# Patient Record
Sex: MALE | Race: WHITE | NOT HISPANIC OR LATINO | Employment: FULL TIME | URBAN - METROPOLITAN AREA
[De-identification: names, ages, dates, MRNs, and addresses within clinical notes are randomized per-mention and may not be internally consistent; named-entity substitution may affect disease eponyms.]

---

## 2017-02-27 ENCOUNTER — OFFICE VISIT (OUTPATIENT)
Dept: SPORTS MEDICINE | Facility: CLINIC | Age: 57
End: 2017-02-27

## 2017-02-27 VITALS
WEIGHT: 188 LBS | HEIGHT: 67 IN | BODY MASS INDEX: 29.51 KG/M2 | OXYGEN SATURATION: 92 % | TEMPERATURE: 98.4 F | HEART RATE: 79 BPM | SYSTOLIC BLOOD PRESSURE: 110 MMHG | DIASTOLIC BLOOD PRESSURE: 74 MMHG | RESPIRATION RATE: 14 BRPM

## 2017-02-27 DIAGNOSIS — N40.0 BENIGN PROSTATIC HYPERPLASIA, PRESENCE OF LOWER URINARY TRACT SYMPTOMS UNSPECIFIED, UNSPECIFIED MORPHOLOGY: ICD-10-CM

## 2017-02-27 DIAGNOSIS — J01.00 ACUTE NON-RECURRENT MAXILLARY SINUSITIS: Primary | ICD-10-CM

## 2017-02-27 PROCEDURE — 99213 OFFICE O/P EST LOW 20 MIN: CPT | Performed by: FAMILY MEDICINE

## 2017-02-27 RX ORDER — IPRATROPIUM BROMIDE 42 UG/1
2 SPRAY, METERED NASAL 3 TIMES DAILY PRN
Qty: 15 ML | Refills: 0 | Status: SHIPPED | OUTPATIENT
Start: 2017-02-27 | End: 2020-08-17

## 2017-02-27 RX ORDER — AMOXICILLIN AND CLAVULANATE POTASSIUM 875; 125 MG/1; MG/1
1 TABLET, FILM COATED ORAL 2 TIMES DAILY
Qty: 20 TABLET | Refills: 0 | Status: SHIPPED | OUTPATIENT
Start: 2017-02-27 | End: 2017-03-09

## 2017-02-27 RX ORDER — TAMSULOSIN HYDROCHLORIDE 0.4 MG/1
1 CAPSULE ORAL NIGHTLY
COMMUNITY

## 2017-02-27 NOTE — PROGRESS NOTES
"Trent is a 56 y.o. year old male    Chief Complaint   Patient presents with   • Nasal Congestion     Since Saturday.    • URI   • Sore Throat   • Cough       History of Present Illness  URI: On and off for the past 3 weeks.  Acutely worsened over the weekend.  He has had sinus pressure, postnasal drip, productive cough with yellow tinged sputum.  Also associates some rhinorrhea that is occasionally tinged with blood.  Has been using Afrin nasal spray sparingly.  Also, has been using Tessalon Perles to help with the cough.  Denies fever or myalgias.    BPH: Recently diagnosed.  [facility]  Has had PSA drawn recently and is trending downward since starting Flomax.    I have reviewed the patient's medical history in detail and updated the computerized patient record.    Review of Systems   Constitutional: Negative for chills, fatigue and fever.   HENT: Positive for congestion, postnasal drip, rhinorrhea and sinus pressure.    Respiratory: Positive for cough. Negative for chest tightness and shortness of breath.    Cardiovascular: Negative for chest pain.   Gastrointestinal: Negative for abdominal pain.   Musculoskeletal: Negative for arthralgias.   Skin: Negative for rash and wound.   Allergic/Immunologic: Negative for environmental allergies.   Neurological: Negative for numbness and headaches.   Hematological: Negative for adenopathy.       Visit Vitals   • /74 (BP Location: Left arm, Patient Position: Sitting, Cuff Size: Adult)   • Pulse 79   • Temp 98.4 °F (36.9 °C) (Oral)   • Resp 14   • Ht 67\" (170.2 cm)   • Wt 188 lb (85.3 kg)   • SpO2 92%   • BMI 29.44 kg/m2        Physical Exam   Constitutional: He is oriented to person, place, and time. He appears well-developed and well-nourished.   HENT:   Head: Normocephalic and atraumatic.   Right Ear: External ear normal.   Left Ear: External ear normal.   Nose: Right sinus exhibits maxillary sinus tenderness. Left sinus exhibits maxillary sinus " tenderness.   Mouth/Throat: Posterior oropharyngeal erythema present.   Eyes: EOM are normal.   Neck: Normal range of motion.   Cardiovascular: Normal rate and regular rhythm.    Pulmonary/Chest: Effort normal and breath sounds normal.   Neurological: He is alert and oriented to person, place, and time.   Skin: Skin is warm and dry. No rash noted.   Psychiatric: He has a normal mood and affect. His behavior is normal.   Nursing note and vitals reviewed.       Diagnoses and all orders for this visit:    Acute non-recurrent maxillary sinusitis  -     amoxicillin-clavulanate (AUGMENTIN) 875-125 MG per tablet; Take 1 tablet by mouth 2 (Two) Times a Day for 10 days.  -     ipratropium (ATROVENT) 0.06 % nasal spray; 2 sprays into each nostril 3 (Three) Times a Day As Needed for rhinitis.    Benign prostatic hyperplasia, presence of lower urinary tract symptoms unspecified, unspecified morphology    Other orders  -     tamsulosin (FLOMAX) 0.4 MG capsule 24 hr capsule; Take 1 capsule by mouth Every Night.      1.  Symptoms greater than 7 days and persistent.  Starting complete antibiotics as written.  Additionally, can try Atrovent nasal spray for rhinorrhea.  2.  Stable.  Followed by urology.    Recommended that patient come back for physical with Dr. Silveira sometime in the next 3 months.

## 2019-01-06 ENCOUNTER — APPOINTMENT (OUTPATIENT)
Dept: GENERAL RADIOLOGY | Facility: HOSPITAL | Age: 59
End: 2019-01-06

## 2019-01-06 PROCEDURE — 71046 X-RAY EXAM CHEST 2 VIEWS: CPT | Performed by: GENERAL PRACTICE

## 2019-05-16 ENCOUNTER — OFFICE VISIT (OUTPATIENT)
Dept: SPORTS MEDICINE | Facility: CLINIC | Age: 59
End: 2019-05-16

## 2019-05-16 VITALS
TEMPERATURE: 98.6 F | BODY MASS INDEX: 30.76 KG/M2 | WEIGHT: 196 LBS | HEIGHT: 67 IN | OXYGEN SATURATION: 98 % | DIASTOLIC BLOOD PRESSURE: 70 MMHG | SYSTOLIC BLOOD PRESSURE: 104 MMHG | HEART RATE: 99 BPM

## 2019-05-16 DIAGNOSIS — J01.10 ACUTE NON-RECURRENT FRONTAL SINUSITIS: Primary | ICD-10-CM

## 2019-05-16 PROCEDURE — 99213 OFFICE O/P EST LOW 20 MIN: CPT | Performed by: FAMILY MEDICINE

## 2019-05-16 RX ORDER — AMOXICILLIN 875 MG/1
875 TABLET, COATED ORAL 2 TIMES DAILY
Qty: 20 TABLET | Refills: 0 | OUTPATIENT
Start: 2019-05-16 | End: 2019-07-13 | Stop reason: HOSPADM

## 2019-05-16 NOTE — PROGRESS NOTES
"Trent is a 58 y.o. year old male evaluation of a problem that is new to this examiner.    Chief Complaint   Patient presents with   • Cough     blowing out yellow mucus from nose - x couple days ago - throat hurts -         History of Present Illness   Cough   This is a new problem. The current episode started in the past 7 days. The problem has been gradually worsening. The problem occurs constantly. The cough is productive of purulent sputum. Associated symptoms include ear congestion, headaches, nasal congestion, postnasal drip and a sore throat. Pertinent negatives include no fever or shortness of breath. Nothing aggravates the symptoms. He has tried OTC cough suppressant for the symptoms. The treatment provided no relief. His past medical history is significant for environmental allergies.        I have reviewed the patient's medical, family, and social history in detail and updated the computerized patient record.    Review of Systems   Constitutional: Positive for fatigue. Negative for fever.   HENT: Positive for postnasal drip and sore throat.    Respiratory: Positive for cough. Negative for shortness of breath.    Allergic/Immunologic: Positive for environmental allergies.   Neurological: Positive for headaches.       /70   Pulse 99   Temp 98.6 °F (37 °C)   Ht 170.2 cm (67.01\")   Wt 88.9 kg (196 lb)   SpO2 98%   BMI 30.69 kg/m²      Physical Exam   Constitutional: He appears well-developed and well-nourished.   HENT:   Right Ear: A middle ear effusion is present.   Left Ear: A middle ear effusion is present.   Mouth/Throat: Posterior oropharyngeal erythema present. No oropharyngeal exudate or posterior oropharyngeal edema.   Cardiovascular: Normal heart sounds.   Pulmonary/Chest: Effort normal and breath sounds normal.   Lymphadenopathy:     He has cervical adenopathy.   Psychiatric: He has a normal mood and affect.   Vitals reviewed.        Current Outpatient Medications:   •  albuterol " sulfate  (90 Base) MCG/ACT inhaler, Inhale 2 puffs Every 4 (Four) Hours As Needed for Wheezing., Disp: 1 inhaler, Rfl: 0  •  amoxicillin (AMOXIL) 875 MG tablet, Take 1 tablet by mouth 2 (Two) Times a Day., Disp: 20 tablet, Rfl: 0  •  ipratropium (ATROVENT) 0.06 % nasal spray, 2 sprays into each nostril 3 (Three) Times a Day As Needed for rhinitis., Disp: 15 mL, Rfl: 0  •  ranitidine (ZANTAC) 150 MG tablet, Take 150 mg by mouth 2 (two) times a day., Disp: , Rfl:   •  tamsulosin (FLOMAX) 0.4 MG capsule 24 hr capsule, Take 1 capsule by mouth Every Night., Disp: , Rfl:   •  triamcinolone (KENALOG) 0.1 % cream, by Intratympanic route., Disp: , Rfl:      Diagnoses and all orders for this visit:    Acute non-recurrent frontal sinusitis  -     amoxicillin (AMOXIL) 875 MG tablet; Take 1 tablet by mouth 2 (Two) Times a Day.             EMR Dragon/Transcription disclaimer:    Much of this encounter note is an electronic transcription/translation of spoken language to printed text.  The electronic translation of spoken language may permit erroneous, or at times, nonsensical words or phrases to be inadvertently transcribed.  Although I have reviewed the note for such errors some may still exist.

## 2020-03-23 ENCOUNTER — TELEPHONE (OUTPATIENT)
Dept: SPORTS MEDICINE | Facility: CLINIC | Age: 60
End: 2020-03-23

## 2020-03-23 RX ORDER — CIPROFLOXACIN AND DEXAMETHASONE 3; 1 MG/ML; MG/ML
4 SUSPENSION/ DROPS AURICULAR (OTIC) 2 TIMES DAILY
Qty: 7.5 ML | Refills: 1 | Status: SHIPPED | OUTPATIENT
Start: 2020-03-23 | End: 2020-08-17

## 2020-03-23 NOTE — TELEPHONE ENCOUNTER
"Pt states that he has an ear infection. Has been prescribed ear drop in the past \"Ciprovox\" he states. Would like to know if you would call something in for him. Ph # [phone]. JJ  "

## 2020-07-07 ENCOUNTER — OFFICE VISIT (OUTPATIENT)
Dept: GASTROENTEROLOGY | Facility: CLINIC | Age: 60
End: 2020-07-07

## 2020-07-07 VITALS — HEIGHT: 67 IN | BODY MASS INDEX: 30.7 KG/M2 | TEMPERATURE: 98.5 F

## 2020-07-07 DIAGNOSIS — K21.9 GASTROESOPHAGEAL REFLUX DISEASE, ESOPHAGITIS PRESENCE NOT SPECIFIED: Primary | ICD-10-CM

## 2020-07-07 DIAGNOSIS — Z86.010 PERSONAL HISTORY OF COLONIC POLYPS: ICD-10-CM

## 2020-07-07 PROCEDURE — 99203 OFFICE O/P NEW LOW 30 MIN: CPT | Performed by: INTERNAL MEDICINE

## 2020-07-07 RX ORDER — APREMILAST 30 MG/1
TABLET, FILM COATED ORAL
COMMUNITY
Start: 2020-06-10

## 2020-07-07 RX ORDER — OMEPRAZOLE 40 MG/1
40 CAPSULE, DELAYED RELEASE ORAL DAILY
Qty: 90 CAPSULE | Refills: 3 | Status: SHIPPED | OUTPATIENT
Start: 2020-07-07 | End: 2021-04-01 | Stop reason: SDUPTHER

## 2020-07-07 NOTE — PROGRESS NOTES
PATIENT INFORMATION  Trent Olvera       - 1960    CHIEF COMPLAINT  Chief Complaint   Patient presents with   • Heartburn       HISTORY OF PRESENT ILLNESS  Has been using Zantac and now Pepcid. Now has progressed to every meal and has been present for 20 years.  And progressive over the last 10 years.     Victor Manuel is this year and working and also has been treated for Lyme disease 7 years ago          REVIEW OF SYSTEMS  Review of Systems   Gastrointestinal:        Reflux   All other systems reviewed and are negative.        ACTIVE PROBLEMS  There are no active problems to display for this patient.        PAST MEDICAL HISTORY  Past Medical History:   Diagnosis Date   • Elevated PSA          SURGICAL HISTORY  Past Surgical History:   Procedure Laterality Date   • ANKLE SURGERY      Fracture. Pins placed    • COLONOSCOPY     • HAND SURGERY      both hands   • KNEE ACL RECONSTRUCTION           FAMILY HISTORY  Family History   Problem Relation Age of Onset   • Heart failure Father    • Diabetes Father    • Obesity Father    • Colon polyps Father    • Colon cancer Neg Hx          SOCIAL HISTORY  Social History     Occupational History   • Not on file   Tobacco Use   • Smoking status: Never Smoker   • Smokeless tobacco: Never Used   Substance and Sexual Activity   • Alcohol use: No   • Drug use: No   • Sexual activity: Defer         CURRENT MEDICATIONS    Current Outpatient Medications:   •  albuterol sulfate  (90 Base) MCG/ACT inhaler, Inhale 2 puffs Every 4 (Four) Hours As Needed for Wheezing., Disp: 1 inhaler, Rfl: 0  •  amoxicillin-clavulanate (AUGMENTIN) 875-125 MG per tablet, Take 1 tablet by mouth Every 12 (Twelve) Hours., Disp: 20 tablet, Rfl: 0  •  ciprofloxacin-dexamethasone (Ciprodex) 0.3-0.1 % otic suspension, Administer 4 drops into ear(s) as directed by provider 2 (Two) Times a Day., Disp: 7.5 mL, Rfl: 1  •  famotidine (PEPCID) 20 MG tablet, Take 20 mg by mouth 2 (Two) Times a Day., Disp:  ", Rfl:   •  guaifenesin-dextromethorphan (MUCINEX DM)  MG tablet sustained-release 12 hour tablet, Take 1 tablet by mouth 2 (Two) Times a Day., Disp: 30 tablet, Rfl: 0  •  ipratropium (ATROVENT) 0.06 % nasal spray, 2 sprays into each nostril 3 (Three) Times a Day As Needed for rhinitis., Disp: 15 mL, Rfl: 0  •  omeprazole (priLOSEC) 40 MG capsule, Take 1 capsule by mouth Daily., Disp: 90 capsule, Rfl: 3  •  OTEZLA 30 MG tablet, , Disp: , Rfl:   •  tamsulosin (FLOMAX) 0.4 MG capsule 24 hr capsule, Take 1 capsule by mouth Every Night., Disp: , Rfl:   •  triamcinolone (KENALOG) 0.1 % cream, by Intratympanic route., Disp: , Rfl:     ALLERGIES  Patient has no known allergies.    VITALS  Vitals:    07/07/20 0840   Temp: 98.5 °F (36.9 °C)   TempSrc: Temporal   Height: 170.2 cm (67\")       LAST RESULTS   Admission on 01/06/2019, Discharged on 01/06/2019   Component Date Value Ref Range Status   • Rapid Influenza A Ag 01/06/2019 Negative  Negative Final   • Rapid Influenza B Ag 01/06/2019 Negative  Negative Final   • Internal Control 01/06/2019 Passed  Passed Final   • Lot Number 01/06/2019 [id number]   Final   • Expiration Date 01/06/2019 12/06/2020   Final     No results found.    PHYSICAL EXAM  Debilities/Disabilities Identified: None  Emotional Behavior: Appropriate  Wt Readings from Last 3 Encounters:   05/16/19 88.9 kg (196 lb)   02/27/17 85.3 kg (188 lb)   07/15/16 90.3 kg (199 lb)     Ht Readings from Last 1 Encounters:   07/07/20 170.2 cm (67\")     Body mass index is 30.7 kg/m².  Physical Exam   Constitutional: He is oriented to person, place, and time. He appears well-developed and well-nourished.   HENT:   Head: Normocephalic and atraumatic.   Eyes: Pupils are equal, round, and reactive to light. Conjunctivae and EOM are normal. No scleral icterus.   Neck: Normal range of motion. Neck supple. No thyromegaly present.   Cardiovascular: Normal rate, regular rhythm, normal heart sounds and intact distal pulses. " Exam reveals no gallop.   No murmur heard.  Pulmonary/Chest: Effort normal and breath sounds normal. He has no wheezes. He has no rales.   Abdominal: Soft. Bowel sounds are normal. He exhibits no shifting dullness, no distension, no fluid wave, no abdominal bruit, no ascites and no mass. There is no hepatosplenomegaly. There is tenderness in the epigastric area. There is no guarding and negative Chilel's sign. Hernia confirmed negative in the ventral area.   Musculoskeletal: Normal range of motion. He exhibits no edema.   Lymphadenopathy:     He has no cervical adenopathy.   Neurological: He is alert and oriented to person, place, and time.   Skin: Skin is warm and dry. No rash noted. He is not diaphoretic. No erythema.   Psychiatric: He has a normal mood and affect. His behavior is normal.       CLINICAL DATA REVIEWED   reviewed previous lab results and integrated with today's visit, reviewed notes from other physicians and/or last GI encounter, reviewed previous endoscopy results and available photos    ASSESSMENT  Diagnoses and all orders for this visit:    Gastroesophageal reflux disease, esophagitis presence not specified  -     Case Request; Standing  -     Case Request    Personal history of colonic polyps  -     Case Request; Standing  -     Case Request    Other orders  -     OTEZLA 30 MG tablet  -     omeprazole (priLOSEC) 40 MG capsule; Take 1 capsule by mouth Daily.  -     Follow Anesthesia Guidelines / Protocol; Future  -     Obtain Informed Consent; Standing          PLAN  Return in about 3 months (around 10/7/2020).    I have discussed the above plan with the patient.  They verbalize understanding and are in agreement with the plan.  They have been advised to contact the office for any questions, concerns, or changes related to their health.

## 2020-07-13 ENCOUNTER — TELEPHONE (OUTPATIENT)
Dept: GASTROENTEROLOGY | Facility: CLINIC | Age: 60
End: 2020-07-13

## 2020-07-13 NOTE — TELEPHONE ENCOUNTER
CALL FROM PATIENT.  SAW DR. GARDNER LAST WEEK AND SOMEONE WAS TO CALL TO SCHEDULE HIS PROCEDURE, AS NOT HEARD FROM ANYONE.  EXPLAINED CALLED LAST WEEK AND AGAIN THIS MORNING.  UNABLE TO LEAVE MESSAGE, MAIL BOX FULL.  VERIFY PHONE NUMBER WHICH WAS INCORRECT.  CORRECT PHONE.    SCHEDULED AT [place] 09/18/2020 AT 1PM - ARRIVE 12:00PM - E-MAIL INSTRUCTIONS [email or web address] TODAY.

## 2020-07-20 ENCOUNTER — PREP FOR SURGERY (OUTPATIENT)
Dept: OTHER | Facility: HOSPITAL | Age: 60
End: 2020-07-20

## 2020-07-20 DIAGNOSIS — Z86.010 PERSONAL HISTORY OF COLONIC POLYPS: ICD-10-CM

## 2020-07-20 DIAGNOSIS — K21.9 GASTROESOPHAGEAL REFLUX DISEASE, ESOPHAGITIS PRESENCE NOT SPECIFIED: ICD-10-CM

## 2020-07-20 DIAGNOSIS — Z12.11 ENCOUNTER FOR SCREENING FOR MALIGNANT NEOPLASM OF COLON: Primary | ICD-10-CM

## 2020-08-17 ENCOUNTER — OFFICE VISIT (OUTPATIENT)
Dept: SPORTS MEDICINE | Facility: CLINIC | Age: 60
End: 2020-08-17

## 2020-08-17 VITALS
BODY MASS INDEX: 31.75 KG/M2 | OXYGEN SATURATION: 96 % | RESPIRATION RATE: 13 BRPM | HEIGHT: 67 IN | WEIGHT: 202.3 LBS | SYSTOLIC BLOOD PRESSURE: 106 MMHG | DIASTOLIC BLOOD PRESSURE: 72 MMHG | TEMPERATURE: 97.3 F | HEART RATE: 72 BPM

## 2020-08-17 DIAGNOSIS — Z00.00 ANNUAL PHYSICAL EXAM: Primary | ICD-10-CM

## 2020-08-17 PROBLEM — E55.9 VITAMIN D DEFICIENCY: Status: ACTIVE | Noted: 2020-08-17

## 2020-08-17 PROBLEM — N13.8 BPH WITH OBSTRUCTION/LOWER URINARY TRACT SYMPTOMS: Status: ACTIVE | Noted: 2020-08-17

## 2020-08-17 PROBLEM — G47.33 OSA ON CPAP: Status: ACTIVE | Noted: 2020-08-17

## 2020-08-17 PROBLEM — Z99.89 OSA ON CPAP: Status: ACTIVE | Noted: 2020-08-17

## 2020-08-17 PROBLEM — K21.9 GERD (GASTROESOPHAGEAL REFLUX DISEASE): Status: ACTIVE | Noted: 2020-08-17

## 2020-08-17 PROBLEM — M17.9 OSTEOARTHRITIS OF KNEE: Status: ACTIVE | Noted: 2020-08-17

## 2020-08-17 PROBLEM — N40.1 BPH WITH OBSTRUCTION/LOWER URINARY TRACT SYMPTOMS: Status: ACTIVE | Noted: 2020-08-17

## 2020-08-17 PROBLEM — A69.20 LYME DISEASE: Status: ACTIVE | Noted: 2020-08-17

## 2020-08-17 PROBLEM — L40.9 PSORIASIS: Status: ACTIVE | Noted: 2020-08-17

## 2020-08-17 PROCEDURE — 99396 PREV VISIT EST AGE 40-64: CPT | Performed by: FAMILY MEDICINE

## 2020-08-17 NOTE — PROGRESS NOTES
"Trent Olvera is here today for an annual physical exam.     Eating a healthy diet. Trying to stay physically active  Diagnosed with Lyme disease around 2015 (Dr. Sanjiv Fernandez in [place]) and struggles with chronic fatigue from that.   CRISTI on CPAP, compliant but does not feel any benefit  GERD stable with omeprazole qAM and pepcid prn  BPH on flomax, symptoms tolerable  Otezla for psoriasis per Dr. Prater, working well    PHQ-2 Depression Screening  Little interest or pleasure in doing things? 0   Feeling down, depressed, or hopeless? 0   PHQ-2 Total Score 0        Health Maintenance   Topic Date Due   • ANNUAL PHYSICAL  10/12/1962   • TDAP/TD VACCINES (1 - Tdap) 10/12/1970   • ZOSTER VACCINE (1 of 2) 10/12/2009   • HEPATITIS C SCREENING  05/16/2019   • COLONOSCOPY  04/03/2020   • INFLUENZA VACCINE  08/01/2020       Review of Systems   Constitutional: Positive for fatigue.   Respiratory: Negative for shortness of breath.    Cardiovascular: Negative for chest pain.   Genitourinary: Positive for frequency.       /72 (BP Location: Left arm, Patient Position: Sitting, Cuff Size: Large Adult)   Pulse 72   Temp 97.3 °F (36.3 °C) (Temporal)   Resp 13   Ht 170.2 cm (67\")   Wt 91.8 kg (202 lb 4.8 oz)   SpO2 96%   BMI 31.68 kg/m²      Physical Exam    Vital signs reviewed.  General appearance: No acute distress  Eyes: conjunctiva clear without erythema; pupils equally round and reactive  ENT: external ears and nose normal; hearing normal  Neck: supple; no thyromegaly  CV: normal rate and rhythm; no peripheral edema  Respiratory: normal respiratory effort; lungs clear to auscultation bilaterally  MSK: normal gait and station; no focal joint deformity or swelling  Skin: no rash or wounds; normal turgor  Neuro: cranial nerves 2-12 grossly intact; normal sensation to light touch  Psych: mood and affect normal; recent and remote memory intact           Current Outpatient Medications:   •  famotidine (PEPCID) 20 " MG tablet, Take 20 mg by mouth 2 (Two) Times a Day., Disp: , Rfl:   •  omeprazole (priLOSEC) 40 MG capsule, Take 1 capsule by mouth Daily., Disp: 90 capsule, Rfl: 3  •  OTEZLA 30 MG tablet, , Disp: , Rfl:   •  tamsulosin (FLOMAX) 0.4 MG capsule 24 hr capsule, Take 1 capsule by mouth Every Night., Disp: , Rfl:   •  triamcinolone (KENALOG) 0.1 % cream, by Intratympanic route., Disp: , Rfl:     Trent was seen today for annual exam.    Diagnoses and all orders for this visit:    Annual physical exam  -     CBC & Differential  -     Comprehensive Metabolic Panel  -     Lipid Panel With / Chol / HDL Ratio  -     Thyroid Cascade Profile  -     Urinalysis With Culture If Indicated -        Encourage healthy diet and exercise.  Encourage patient to stay up to date on screening examinations as indicated based on age and risk factors.  Continue management with specialists for danny, gerd, bph, and psoriasis    EMR Dragon/Transcription disclaimer:    Much of this encounter note is an electronic transcription/translation of spoken language to printed text.  The electronic translation of spoken language may permit erroneous, or at times, nonsensical words or phrases to be inadvertently transcribed.  Although I have reviewed the note for such errors some may still exist.

## 2020-08-18 LAB
ALBUMIN SERPL-MCNC: 4.5 G/DL (ref 3.5–5.2)
ALBUMIN/GLOB SERPL: 2.1 G/DL
ALP SERPL-CCNC: 65 U/L (ref 39–117)
ALT SERPL-CCNC: 19 U/L (ref 1–41)
APPEARANCE UR: CLEAR
AST SERPL-CCNC: 22 U/L (ref 1–40)
BACTERIA #/AREA URNS HPF: NORMAL /HPF
BASOPHILS # BLD AUTO: 0.06 10*3/MM3 (ref 0–0.2)
BASOPHILS NFR BLD AUTO: 0.9 % (ref 0–1.5)
BILIRUB SERPL-MCNC: 0.3 MG/DL (ref 0–1.2)
BILIRUB UR QL STRIP: NEGATIVE
BUN SERPL-MCNC: 17 MG/DL (ref 8–23)
BUN/CREAT SERPL: 14.2 (ref 7–25)
CALCIUM SERPL-MCNC: 9.8 MG/DL (ref 8.6–10.5)
CHLORIDE SERPL-SCNC: 103 MMOL/L (ref 98–107)
CHOLEST SERPL-MCNC: 163 MG/DL (ref 0–200)
CHOLEST/HDLC SERPL: 3.7 {RATIO}
CO2 SERPL-SCNC: 27.5 MMOL/L (ref 22–29)
COLOR UR: YELLOW
CREAT SERPL-MCNC: 1.2 MG/DL (ref 0.76–1.27)
EOSINOPHIL # BLD AUTO: 0.06 10*3/MM3 (ref 0–0.4)
EOSINOPHIL NFR BLD AUTO: 0.9 % (ref 0.3–6.2)
EPI CELLS #/AREA URNS HPF: NORMAL /HPF (ref 0–10)
ERYTHROCYTE [DISTWIDTH] IN BLOOD BY AUTOMATED COUNT: 12.6 % (ref 12.3–15.4)
GLOBULIN SER CALC-MCNC: 2.1 GM/DL
GLUCOSE SERPL-MCNC: 98 MG/DL (ref 65–99)
GLUCOSE UR QL: NEGATIVE
HCT VFR BLD AUTO: 42.6 % (ref 37.5–51)
HCV AB S/CO SERPL IA: <0.1 S/CO RATIO (ref 0–0.9)
HDLC SERPL-MCNC: 44 MG/DL (ref 40–60)
HGB BLD-MCNC: 14.6 G/DL (ref 13–17.7)
HGB UR QL STRIP: NEGATIVE
IMM GRANULOCYTES # BLD AUTO: 0.03 10*3/MM3 (ref 0–0.05)
IMM GRANULOCYTES NFR BLD AUTO: 0.4 % (ref 0–0.5)
KETONES UR QL STRIP: NEGATIVE
LDLC SERPL CALC-MCNC: 103 MG/DL (ref 0–100)
LEUKOCYTE ESTERASE UR QL STRIP: NEGATIVE
LYMPHOCYTES # BLD AUTO: 1.8 10*3/MM3 (ref 0.7–3.1)
LYMPHOCYTES NFR BLD AUTO: 26 % (ref 19.6–45.3)
MCH RBC QN AUTO: 31.7 PG (ref 26.6–33)
MCHC RBC AUTO-ENTMCNC: 34.3 G/DL (ref 31.5–35.7)
MCV RBC AUTO: 92.6 FL (ref 79–97)
MICRO URNS: NORMAL
MICRO URNS: NORMAL
MONOCYTES # BLD AUTO: 0.57 10*3/MM3 (ref 0.1–0.9)
MONOCYTES NFR BLD AUTO: 8.2 % (ref 5–12)
NEUTROPHILS # BLD AUTO: 4.4 10*3/MM3 (ref 1.7–7)
NEUTROPHILS NFR BLD AUTO: 63.6 % (ref 42.7–76)
NITRITE UR QL STRIP: NEGATIVE
NRBC BLD AUTO-RTO: 0 /100 WBC (ref 0–0.2)
PH UR STRIP: 5 [PH] (ref 5–7.5)
PLATELET # BLD AUTO: 210 10*3/MM3 (ref 140–450)
POTASSIUM SERPL-SCNC: 4.5 MMOL/L (ref 3.5–5.2)
PROT SERPL-MCNC: 6.6 G/DL (ref 6–8.5)
PROT UR QL STRIP: NEGATIVE
RBC # BLD AUTO: 4.6 10*6/MM3 (ref 4.14–5.8)
RBC #/AREA URNS HPF: NORMAL /HPF (ref 0–2)
SODIUM SERPL-SCNC: 142 MMOL/L (ref 136–145)
SP GR UR: 1.02 (ref 1–1.03)
TRIGL SERPL-MCNC: 82 MG/DL (ref 0–150)
TSH SERPL DL<=0.005 MIU/L-ACNC: 1.09 UIU/ML (ref 0.45–4.5)
URINALYSIS REFLEX: NORMAL
UROBILINOGEN UR STRIP-MCNC: 0.2 MG/DL (ref 0.2–1)
VLDLC SERPL CALC-MCNC: 16.4 MG/DL (ref 5–40)
WBC # BLD AUTO: 6.92 10*3/MM3 (ref 3.4–10.8)
WBC #/AREA URNS HPF: NORMAL /HPF (ref 0–5)

## 2020-09-15 ENCOUNTER — LAB REQUISITION (OUTPATIENT)
Dept: LAB | Facility: HOSPITAL | Age: 60
End: 2020-09-15

## 2020-09-15 DIAGNOSIS — Z00.00 ENCOUNTER FOR GENERAL ADULT MEDICAL EXAMINATION WITHOUT ABNORMAL FINDINGS: ICD-10-CM

## 2020-09-16 PROCEDURE — U0004 COV-19 TEST NON-CDC HGH THRU: HCPCS | Performed by: INTERNAL MEDICINE

## 2020-09-17 LAB — SARS-COV-2 RNA RESP QL NAA+PROBE: NOT DETECTED

## 2020-09-18 ENCOUNTER — LAB REQUISITION (OUTPATIENT)
Dept: LAB | Facility: HOSPITAL | Age: 60
End: 2020-09-18

## 2020-09-18 ENCOUNTER — OUTSIDE FACILITY SERVICE (OUTPATIENT)
Dept: GASTROENTEROLOGY | Facility: CLINIC | Age: 60
End: 2020-09-18

## 2020-09-18 DIAGNOSIS — K21.9 GASTRO-ESOPHAGEAL REFLUX DISEASE WITHOUT ESOPHAGITIS: ICD-10-CM

## 2020-09-18 PROCEDURE — 45385 COLONOSCOPY W/LESION REMOVAL: CPT | Performed by: INTERNAL MEDICINE

## 2020-09-18 PROCEDURE — 45380 COLONOSCOPY AND BIOPSY: CPT | Performed by: INTERNAL MEDICINE

## 2020-09-18 PROCEDURE — 43239 EGD BIOPSY SINGLE/MULTIPLE: CPT | Performed by: INTERNAL MEDICINE

## 2020-09-18 PROCEDURE — 88305 TISSUE EXAM BY PATHOLOGIST: CPT | Performed by: INTERNAL MEDICINE

## 2020-09-21 LAB
CYTO UR: NORMAL
LAB AP CASE REPORT: NORMAL
LAB AP CLINICAL INFORMATION: NORMAL
PATH REPORT.FINAL DX SPEC: NORMAL
PATH REPORT.GROSS SPEC: NORMAL

## 2021-03-22 ENCOUNTER — BULK ORDERING (OUTPATIENT)
Dept: CASE MANAGEMENT | Facility: OTHER | Age: 61
End: 2021-03-22

## 2021-03-22 DIAGNOSIS — Z23 IMMUNIZATION DUE: ICD-10-CM

## 2021-04-01 ENCOUNTER — OFFICE VISIT (OUTPATIENT)
Dept: GASTROENTEROLOGY | Facility: CLINIC | Age: 61
End: 2021-04-01

## 2021-04-01 VITALS — TEMPERATURE: 97.2 F | BODY MASS INDEX: 32.62 KG/M2 | WEIGHT: 207.8 LBS | HEIGHT: 67 IN

## 2021-04-01 DIAGNOSIS — K62.5 RECTAL BLEEDING: ICD-10-CM

## 2021-04-01 DIAGNOSIS — Z87.19 HISTORY OF GI DIVERTICULAR BLEED: ICD-10-CM

## 2021-04-01 DIAGNOSIS — K22.70 BARRETT'S ESOPHAGUS WITHOUT DYSPLASIA: ICD-10-CM

## 2021-04-01 DIAGNOSIS — K21.00 GASTROESOPHAGEAL REFLUX DISEASE WITH ESOPHAGITIS WITHOUT HEMORRHAGE: Primary | ICD-10-CM

## 2021-04-01 DIAGNOSIS — K57.90 DIVERTICULOSIS: ICD-10-CM

## 2021-04-01 PROCEDURE — 99213 OFFICE O/P EST LOW 20 MIN: CPT | Performed by: NURSE PRACTITIONER

## 2021-04-01 RX ORDER — OMEPRAZOLE 40 MG/1
40 CAPSULE, DELAYED RELEASE ORAL DAILY
Qty: 90 CAPSULE | Refills: 3 | Status: SHIPPED | OUTPATIENT
Start: 2021-04-01 | End: 2022-05-02

## 2021-04-01 NOTE — PATIENT INSTRUCTIONS
"Continue your omeprazole 40mg once a day permanently.  Increase the fiber in your diet.  See suggestions below.      Don't eat late, don't eat fried or spicy, and don't eat too much at one time. Avoid tomato based products like pizza, lasagna, spaghetti.  If you want to have these take an over the counter Pepcid or TUMS immediately before you eat them.  Do not eat within 3-4 hrs of bedtime. Limit caffeine and carbonated beverages, if you must have them do not have later in the day.  If reflux is severe elevate the head of the bed. You may also use TUMS, maalox, or mylanta for breakthrough heartburn.    Take a daily probiotic (something with a billion cultures and more different strains is better like \"Probiotic 10\" or probiotic gummies) for your gut as well.  AVOID taking NSAIDS (like ibuprofen, Aleve, Motrin, naproxen, meloxicam, etc) as much as possible and use acetaminophen (Tylenol) instead.    Drink lots of water, eat a high fiber diet with 25-30gm a day(check the fiber content in the foods you eat.  Protein bars with 15gm of fiber in each bar are a great supplement daily), and get regular exercise. Use over the counter simethicone xtra strength gas x (125-180mg) 2 twice a day as needed for gas.     High Fiber Food suggestions:    Beans, nuts, vegetables, whole grains, flax seed and patrick seeds (which can be stirred into other food) are high in fiber.    Tavares Protein bars from [employer] = 10gm of fiber in each bar (also gluten free)  Quest Protein bars from grocery stores [employer], [employer], [employer]= 15gm of fiber each (also gluten free)  Fiber One bars from grocery stores = 5-6gm of fiber each  Kelloggs Bran Buds cereal 1/2 cup = 17gm of fiber  [employer] brand low sugar Craisins = 13gm of fiber per serving  Melalueca Fiberwise powder=15gm fiber per scoop      "

## 2021-04-01 NOTE — PROGRESS NOTES
PATIENT INFORMATION  Trent Olvera       - 1960    CHIEF COMPLAINT  Chief Complaint   Patient presents with   • Rectal Bleeding       HISTORY OF PRESENT ILLNESS  20 EGD was for Casanova's Pos Casanova's, Negative for Dysplasia    Colon:   4/5 adenomas so recall in 3 years both EGD/CS   DX: GERD, Barretts, lyme disease with chronic fatigue since 2013 week of 22nd urgency and sat down and large bloody bm.  Later that day less blood, mon nothing then tues blood again and nothing since then.  Doesn't report any current hemorrhoids.  bm 2-3 a day  Is on keto diet,   Hb is rare and takes extra tums 2-3 times a month.     REVIEWED PERTINENT RESULTS/ LABS  Lab Results   Component Value Date    CASEREPORT  2020     Surgical Pathology Report                         Case: [id number]                                  Authorizing Provider:  Bong Cuadra        Collected:           2020 01:28 PM                                 MD Irena                                                                   Ordering Location:     [facility]  Received:            2020 03:11 PM                                 LABORATORY                                                                   Pathologist:           Trent Finch MD                                                         Specimens:   1) - Esophagus, Distal, distal esophagus                                                            2) - Large Intestine, Right / Ascending Colon, ascending colon polyp                                3) - Large Intestine, Transverse Colon, transverse colon polyp x3                                   4) - Large Intestine, Sigmoid Colon, sigmoid polyp                                         FINALDX  2020     1. Esophagus, Distal, Biopsy: Benign squamous and gastric mucosa with  A. Intestinal metaplasia consistent with Casanova's esophagus with reactive changes and no  dysplasia.    2. Colon, Right Ascending, Biopsy:   A. Hyperplastic polyp.    3. Colon, Transverse, Biopsy:   A. Tubular adenomas.    4. Colon, Sigmoid, Biopsy:  A. Tubular adenoma.    carissa/pkm         Lab Results   Component Value Date    HGB 14.6 08/17/2020    MCV 92.6 08/17/2020     08/17/2020    ALT 19 08/17/2020    AST 22 08/17/2020    HGBA1C 5.6 12/29/2014    TRIG 82 08/17/2020    FERRITIN 155.0 12/29/2014    TIBC 378 12/29/2014      No results found.    REVIEW OF SYSTEMS  Review of Systems   Gastrointestinal: Positive for anal bleeding.   All other systems reviewed and are negative.        ACTIVE PROBLEMS  Patient Active Problem List    Diagnosis    • History of GI diverticular bleed [Z87.19]    • Diverticulosis [K57.90]    • Casanova's esophagus without dysplasia [K22.70]    • Osteoarthritis of knee [M17.10]    • Vitamin D deficiency [E55.9]    • CRISTI on CPAP [G47.33, Z99.89]    • Lyme disease [A69.20]    • GERD (gastroesophageal reflux disease) [K21.9]    • BPH with obstruction/lower urinary tract symptoms [N40.1, N13.8]    • Psoriasis [L40.9]          PAST MEDICAL HISTORY  Past Medical History:   Diagnosis Date   • Casanova esophagus    • Colon polyp    • Elevated PSA    • GERD (gastroesophageal reflux disease)          SURGICAL HISTORY  Past Surgical History:   Procedure Laterality Date   • ANKLE SURGERY      Fracture. Pins placed    • COLONOSCOPY     • HAND SURGERY      both hands   • KNEE ACL RECONSTRUCTION           FAMILY HISTORY  Family History   Problem Relation Age of Onset   • Heart failure Father    • Diabetes Father    • Obesity Father    • Colon polyps Father    • Colon cancer Neg Hx          SOCIAL HISTORY  Social History     Occupational History   • Not on file   Tobacco Use   • Smoking status: Never Smoker   • Smokeless tobacco: Never Used   Vaping Use   • Vaping Use: Never used   Substance and Sexual Activity   • Alcohol use: No   • Drug use: No   • Sexual activity: Defer         CURRENT  "MEDICATIONS    Current Outpatient Medications:   •  omeprazole (priLOSEC) 40 MG capsule, Take 1 capsule by mouth Daily., Disp: 90 capsule, Rfl: 3  •  OTEZLA 30 MG tablet, , Disp: , Rfl:   •  tamsulosin (FLOMAX) 0.4 MG capsule 24 hr capsule, Take 1 capsule by mouth Every Night., Disp: , Rfl:   •  triamcinolone (KENALOG) 0.1 % cream, by Intratympanic route., Disp: , Rfl:     ALLERGIES  Patient has no known allergies.    VITALS  Vitals:    04/01/21 1430   Temp: 97.2 °F (36.2 °C)   TempSrc: Temporal   Weight: 94.3 kg (207 lb 12.8 oz)   Height: 170.2 cm (67.01\")       PHYSICAL EXAM  Debilities/Disabilities Identified: None  Emotional Behavior: Appropriate  Wt Readings from Last 3 Encounters:   04/01/21 94.3 kg (207 lb 12.8 oz)   08/17/20 91.8 kg (202 lb 4.8 oz)   05/16/19 88.9 kg (196 lb)     Ht Readings from Last 1 Encounters:   04/01/21 170.2 cm (67.01\")     Body mass index is 32.54 kg/m².  Physical Exam  Vitals and nursing note reviewed.   Constitutional:       Appearance: He is well-developed.   HENT:      Head: Normocephalic and atraumatic.   Eyes:      Conjunctiva/sclera: Conjunctivae normal.      Pupils: Pupils are equal, round, and reactive to light.   Cardiovascular:      Rate and Rhythm: Normal rate and regular rhythm.   Pulmonary:      Effort: Pulmonary effort is normal.      Breath sounds: Normal breath sounds.   Abdominal:      General: Bowel sounds are normal. There is no distension.      Palpations: Abdomen is soft.      Tenderness: There is abdominal tenderness in the suprapubic area and left lower quadrant.   Musculoskeletal:         General: Normal range of motion.      Cervical back: Normal range of motion and neck supple.   Lymphadenopathy:      Cervical: No cervical adenopathy.   Skin:     General: Skin is warm and dry.   Neurological:      Mental Status: He is alert and oriented to person, place, and time.   Psychiatric:         Behavior: Behavior normal.         CLINICAL DATA REVIEWED   reviewed " "previous lab results and integrated with today's visit, reviewed notes from other physicians and/or last GI encounter, reviewed previous endoscopy results and available photos, reviewed surgical pathology results from previous biopsies    ASSESSMENT  Diagnoses and all orders for this visit:    Gastroesophageal reflux disease with esophagitis without hemorrhage    History of GI diverticular bleed    Diverticulosis    Casanova's esophagus without dysplasia    Rectal bleeding    Other orders  -     omeprazole (priLOSEC) 40 MG capsule; Take 1 capsule by mouth Daily.          PLAN  Return if symptoms worsen or fail to improve.     Continue your omeprazole 40mg once a day permanently.  Increase the fiber in your diet.  See suggestions below.      Don't eat late, don't eat fried or spicy, and don't eat too much at one time. Avoid tomato based products like pizza, lasagna, spaghetti.  If you want to have these take an over the counter Pepcid or TUMS immediately before you eat them.  Do not eat within 3-4 hrs of bedtime. Limit caffeine and carbonated beverages, if you must have them do not have later in the day.  If reflux is severe elevate the head of the bed. You may also use TUMS, maalox, or mylanta for breakthrough heartburn.    Take a daily probiotic (something with a billion cultures and more different strains is better like \"Probiotic 10\" or probiotic gummies) for your gut as well.  AVOID taking NSAIDS (like ibuprofen, Aleve, Motrin, naproxen, meloxicam, etc) as much as possible and use acetaminophen (Tylenol) instead.    Drink lots of water, eat a high fiber diet with 25-30gm a day(check the fiber content in the foods you eat.  Protein bars with 15gm of fiber in each bar are a great supplement daily), and get regular exercise. Use over the counter simethicone xtra strength gas x (125-180mg) 2 twice a day as needed for gas.     High Fiber Food suggestions:    Beans, nuts, vegetables, whole grains, flax seed and patrick " seeds (which can be stirred into other food) are high in fiber.    Tavares Protein bars from [employer] = 10gm of fiber in each bar (also gluten free)  Quest Protein bars from grocery stores [employer], [employer], [employer]= 15gm of fiber each (also gluten free)  Fiber One bars from grocery stores = 5-6gm of fiber each  Kelloggs Bran Buds cereal 1/2 cup = 17gm of fiber  [employer]ger brand low sugar Craisins = 13gm of fiber per serving  Melalueca Fiberwise powder=15gm fiber per scoop      I have discussed the above plan with the patient.  They verbalize understanding and are in agreement with the plan.  They have been advised to contact the office for any questions, concerns, or changes related to their health.

## 2022-05-02 RX ORDER — OMEPRAZOLE 40 MG/1
CAPSULE, DELAYED RELEASE ORAL
Qty: 30 CAPSULE | Refills: 4 | Status: SHIPPED | OUTPATIENT
Start: 2022-05-02 | End: 2022-10-10 | Stop reason: SDUPTHER

## 2022-06-01 ENCOUNTER — OFFICE VISIT (OUTPATIENT)
Dept: SPORTS MEDICINE | Facility: CLINIC | Age: 62
End: 2022-06-01

## 2022-06-01 VITALS
OXYGEN SATURATION: 97 % | BODY MASS INDEX: 32.02 KG/M2 | DIASTOLIC BLOOD PRESSURE: 70 MMHG | TEMPERATURE: 97.8 F | HEART RATE: 89 BPM | SYSTOLIC BLOOD PRESSURE: 110 MMHG | HEIGHT: 67 IN | WEIGHT: 204 LBS

## 2022-06-01 DIAGNOSIS — M79.606 PAINFUL AND COLD LOWER EXTREMITY: Primary | ICD-10-CM

## 2022-06-01 DIAGNOSIS — R53.83 FATIGUE, UNSPECIFIED TYPE: ICD-10-CM

## 2022-06-01 DIAGNOSIS — R20.9 PAINFUL AND COLD LOWER EXTREMITY: Primary | ICD-10-CM

## 2022-06-01 DIAGNOSIS — E55.9 VITAMIN D DEFICIENCY: ICD-10-CM

## 2022-06-01 PROCEDURE — 99214 OFFICE O/P EST MOD 30 MIN: CPT | Performed by: FAMILY MEDICINE

## 2022-06-01 NOTE — PROGRESS NOTES
"Trent is a 61 y.o. year old male    Chief Complaint   Patient presents with   • Cold Extremity     BILATERAL- states that it has been going on for a couple of months. States that his legs are cold all the time. He isn't sure if it has anything to do with his thyroid, would like further eval.       History of Present Illness   HPI   Bilateral lower legs feeling cold, then eventually causes aching. Daily. No exacerbating factors. Feels better with walking or warming. Just legs. Worsening for 2-3 months. No skin changes.   Otherwise feeling well except does note some fatigue.     Review of Systems    /70 (BP Location: Left arm, Patient Position: Sitting, Cuff Size: Adult)   Pulse 89   Temp 97.8 °F (36.6 °C) (Temporal)   Ht 170.2 cm (67.01\")   Wt 92.5 kg (204 lb)   SpO2 97%   BMI 31.94 kg/m²          Physical Exam  Vitals reviewed.   Constitutional:       Appearance: Normal appearance.   Cardiovascular:      Pulses:           Dorsalis pedis pulses are 1+ on the right side and 1+ on the left side.        Posterior tibial pulses are 1+ on the right side and 1+ on the left side.   Pulmonary:      Effort: Pulmonary effort is normal.   Musculoskeletal:      Right lower leg: No edema.      Left lower leg: No edema.      Comments: Normal strength bilateral lower extremities.  There is no calf tenderness   Skin:     General: Skin is warm and dry.      Capillary Refill: Capillary refill takes 2 to 3 seconds.      Findings: No erythema or rash.   Neurological:      General: No focal deficit present.      Mental Status: He is alert.      Sensory: No sensory deficit (Normal sensation bilateral feet with light touch and vibration).   Psychiatric:         Mood and Affect: Mood normal.           Current Outpatient Medications:   •  montelukast (SINGULAIR) 10 MG tablet, Take 1 tablet by mouth Every Night., Disp: 15 tablet, Rfl: 0  •  omeprazole (priLOSEC) 40 MG capsule, TAKE ONE CAPSULE BY MOUTH DAILY, Disp: 30 capsule, " Rfl: 4  •  OTEZLA 30 MG tablet, , Disp: , Rfl:   •  tamsulosin (FLOMAX) 0.4 MG capsule 24 hr capsule, Take 1 capsule by mouth Every Night., Disp: , Rfl:   •  triamcinolone (KENALOG) 0.1 % cream, by Intratympanic route., Disp: , Rfl:      Diagnoses and all orders for this visit:    Painful and cold lower extremity  -     TSH  -     Thyroid Antibodies  -     T4, Free  -     T3, Free  -     Vitamin B12  -     Iron Profile  -     Ferritin  -     Folate  -     CBC & Differential  -     Comprehensive Metabolic Panel  -     Vitamin D 25 Hydroxy    Fatigue, unspecified type  -     TSH  -     Thyroid Antibodies  -     T4, Free  -     T3, Free  -     Vitamin B12  -     Iron Profile  -     Ferritin  -     Folate  -     CBC & Differential  -     Comprehensive Metabolic Panel  -     Vitamin D 25 Hydroxy    Vitamin D deficiency  -     Vitamin D 25 Hydroxy    If lab work-up negative consider TRAVON next        EMR Dragon/Transcription disclaimer:    Much of this encounter note is an electronic transcription/translation of spoken language to printed text.  The electronic translation of spoken language may permit erroneous, or at times, nonsensical words or phrases to be inadvertently transcribed.  Although I have reviewed the note for such errors some may still exist.

## 2022-06-03 LAB
25(OH)D3+25(OH)D2 SERPL-MCNC: 31.6 NG/ML (ref 30–100)
ALBUMIN SERPL-MCNC: 4.4 G/DL (ref 3.8–4.8)
ALBUMIN/GLOB SERPL: 2.2 {RATIO} (ref 1.2–2.2)
ALP SERPL-CCNC: 73 IU/L (ref 44–121)
ALT SERPL-CCNC: 15 IU/L (ref 0–44)
AST SERPL-CCNC: 18 IU/L (ref 0–40)
BASOPHILS # BLD AUTO: 0.1 X10E3/UL (ref 0–0.2)
BASOPHILS NFR BLD AUTO: 1 %
BILIRUB SERPL-MCNC: 0.2 MG/DL (ref 0–1.2)
BUN SERPL-MCNC: 16 MG/DL (ref 8–27)
BUN/CREAT SERPL: 11 (ref 10–24)
CALCIUM SERPL-MCNC: 9.3 MG/DL (ref 8.6–10.2)
CHLORIDE SERPL-SCNC: 105 MMOL/L (ref 96–106)
CO2 SERPL-SCNC: 21 MMOL/L (ref 20–29)
CREAT SERPL-MCNC: 1.52 MG/DL (ref 0.76–1.27)
EGFRCR SERPLBLD CKD-EPI 2021: 51 ML/MIN/1.73
EOSINOPHIL # BLD AUTO: 0 X10E3/UL (ref 0–0.4)
EOSINOPHIL NFR BLD AUTO: 1 %
ERYTHROCYTE [DISTWIDTH] IN BLOOD BY AUTOMATED COUNT: 12.1 % (ref 11.6–15.4)
FERRITIN SERPL-MCNC: 212 NG/ML (ref 30–400)
FOLATE SERPL-MCNC: 3.3 NG/ML
GLOBULIN SER CALC-MCNC: 2 G/DL (ref 1.5–4.5)
GLUCOSE SERPL-MCNC: 122 MG/DL (ref 65–99)
HCT VFR BLD AUTO: 47 % (ref 37.5–51)
HGB BLD-MCNC: 16 G/DL (ref 13–17.7)
IMM GRANULOCYTES # BLD AUTO: 0 X10E3/UL (ref 0–0.1)
IMM GRANULOCYTES NFR BLD AUTO: 0 %
IRON SATN MFR SERPL: 19 % (ref 15–55)
IRON SERPL-MCNC: 62 UG/DL (ref 38–169)
LYMPHOCYTES # BLD AUTO: 1.7 X10E3/UL (ref 0.7–3.1)
LYMPHOCYTES NFR BLD AUTO: 24 %
MCH RBC QN AUTO: 31.3 PG (ref 26.6–33)
MCHC RBC AUTO-ENTMCNC: 34 G/DL (ref 31.5–35.7)
MCV RBC AUTO: 92 FL (ref 79–97)
MONOCYTES # BLD AUTO: 0.7 X10E3/UL (ref 0.1–0.9)
MONOCYTES NFR BLD AUTO: 10 %
NEUTROPHILS # BLD AUTO: 4.6 X10E3/UL (ref 1.4–7)
NEUTROPHILS NFR BLD AUTO: 64 %
PLATELET # BLD AUTO: 217 X10E3/UL (ref 150–450)
POTASSIUM SERPL-SCNC: 4.7 MMOL/L (ref 3.5–5.2)
PROT SERPL-MCNC: 6.4 G/DL (ref 6–8.5)
RBC # BLD AUTO: 5.11 X10E6/UL (ref 4.14–5.8)
SODIUM SERPL-SCNC: 141 MMOL/L (ref 134–144)
T3FREE SERPL-MCNC: 3.2 PG/ML (ref 2–4.4)
T4 FREE SERPL-MCNC: 1.18 NG/DL (ref 0.82–1.77)
THYROGLOB AB SERPL-ACNC: <1 IU/ML (ref 0–0.9)
THYROPEROXIDASE AB SERPL-ACNC: 13 IU/ML (ref 0–34)
TIBC SERPL-MCNC: 318 UG/DL (ref 250–450)
TSH SERPL DL<=0.005 MIU/L-ACNC: 1.13 UIU/ML (ref 0.45–4.5)
UIBC SERPL-MCNC: 256 UG/DL (ref 111–343)
VIT B12 SERPL-MCNC: 760 PG/ML (ref 232–1245)
WBC # BLD AUTO: 7.1 X10E3/UL (ref 3.4–10.8)

## 2022-06-05 DIAGNOSIS — M79.606 PAINFUL AND COLD LOWER EXTREMITY: Primary | ICD-10-CM

## 2022-06-05 DIAGNOSIS — R20.9 PAINFUL AND COLD LOWER EXTREMITY: Primary | ICD-10-CM

## 2022-06-21 ENCOUNTER — HOSPITAL ENCOUNTER (OUTPATIENT)
Dept: CARDIOLOGY | Facility: HOSPITAL | Age: 62
Discharge: HOME | End: 2022-06-21
Admitting: FAMILY MEDICINE

## 2022-06-21 DIAGNOSIS — M79.606 PAINFUL AND COLD LOWER EXTREMITY: ICD-10-CM

## 2022-06-21 DIAGNOSIS — R20.9 PAINFUL AND COLD LOWER EXTREMITY: ICD-10-CM

## 2022-06-21 LAB
BH CV LOWER ARTERIAL LEFT ABI RATIO: 1.22
BH CV LOWER ARTERIAL LEFT DORSALIS PEDIS SYS MAX: 134
BH CV LOWER ARTERIAL LEFT GREAT TOE SYS MAX: 150
BH CV LOWER ARTERIAL LEFT POST TIBIAL SYS MAX: 142
BH CV LOWER ARTERIAL LEFT TBI RATIO: 1.29
BH CV LOWER ARTERIAL RIGHT ABI RATIO: 1.22
BH CV LOWER ARTERIAL RIGHT DORSALIS PEDIS SYS MAX: 139
BH CV LOWER ARTERIAL RIGHT GREAT TOE SYS MAX: 137
BH CV LOWER ARTERIAL RIGHT POST TIBIAL SYS MAX: 142
BH CV LOWER ARTERIAL RIGHT TBI RATIO: 1.18
MAXIMAL PREDICTED HEART RATE: 158 BPM
STRESS TARGET HR: 134 BPM
UPPER ARTERIAL LEFT ARM BRACHIAL SYS MAX: 116 MMHG
UPPER ARTERIAL RIGHT ARM BRACHIAL SYS MAX: 107 MMHG

## 2022-06-21 PROCEDURE — 93922 UPR/L XTREMITY ART 2 LEVELS: CPT

## 2022-10-10 RX ORDER — OMEPRAZOLE 40 MG/1
40 CAPSULE, DELAYED RELEASE ORAL DAILY
Qty: 30 CAPSULE | Refills: 0 | Status: SHIPPED | OUTPATIENT
Start: 2022-10-10 | End: 2022-10-25 | Stop reason: SDUPTHER

## 2022-10-25 ENCOUNTER — OFFICE VISIT (OUTPATIENT)
Dept: GASTROENTEROLOGY | Facility: CLINIC | Age: 62
End: 2022-10-25

## 2022-10-25 VITALS
HEIGHT: 67 IN | DIASTOLIC BLOOD PRESSURE: 84 MMHG | WEIGHT: 210.2 LBS | BODY MASS INDEX: 32.99 KG/M2 | SYSTOLIC BLOOD PRESSURE: 122 MMHG

## 2022-10-25 DIAGNOSIS — K22.70 BARRETT'S ESOPHAGUS WITHOUT DYSPLASIA: Primary | ICD-10-CM

## 2022-10-25 PROCEDURE — 99213 OFFICE O/P EST LOW 20 MIN: CPT

## 2022-10-25 RX ORDER — OMEPRAZOLE 40 MG/1
40 CAPSULE, DELAYED RELEASE ORAL 2 TIMES DAILY
Qty: 180 CAPSULE | Refills: 3 | Status: SHIPPED | OUTPATIENT
Start: 2022-10-25

## 2022-10-25 NOTE — PROGRESS NOTES
"    PATIENT INFORMATION  Trent Olvera       - 1960    CHIEF COMPLAINT  Chief Complaint   Patient presents with   • Follow-up     Barretts       HISTORY OF PRESENT ILLNESS  Here today for Barretts follow-up. Continuing on daily omeprazole, but feels heart burn towards end of the day, especially if eating the wrong thing. But odd foods, pumpkin muffins and chicken most recently. Felt like it was nearly perfectly controlled after starting PPI. No OTC antacids. Has mostly stopped NSAIDs and started tumeric which has helped with arthritis. Was taking frequently, but now 2-3 times per week. No dysphagia, odynophagia, nausea, vomiting, bloating, belching. Mother with \"digestive issues.\"  Encouraged to use tylenol arthritis regularly. Derm said Otezla helps with arthritis.    Last colon and EGD 20 with Barretts and 4 of 5 adenomas. Recall in for both in 3 years.    Wife complaining of foul gas. 2-3 times solid stools. No diarrhea, bleeding, mucous, abdominal pain, bloating. Last antibiotics in July for sinus infection.      REVIEWED PERTINENT RESULTS/ LABS  Lab Results   Component Value Date    CASEREPORT  2020     Surgical Pathology Report                         Case: [id number]                                  Authorizing Provider:  Bong Cuadra        Collected:           2020 01:28 PM                                 MD Irena                                                                   Ordering Location:     [facility]  Received:            2020 03:11 PM                                 LABORATORY                                                                   Pathologist:           Trent Finch MD                                                         Specimens:   1) - Esophagus, Distal, distal esophagus                                                            2) - Large Intestine, Right / Ascending Colon, ascending colon polyp                     "            3) - Large Intestine, Transverse Colon, transverse colon polyp x3                                   4) - Large Intestine, Sigmoid Colon, sigmoid polyp                                         FINALDX  09/18/2020     1. Esophagus, Distal, Biopsy: Benign squamous and gastric mucosa with  A. Intestinal metaplasia consistent with Casanova's esophagus with reactive changes and no dysplasia.    2. Colon, Right Ascending, Biopsy:   A. Hyperplastic polyp.    3. Colon, Transverse, Biopsy:   A. Tubular adenomas.    4. Colon, Sigmoid, Biopsy:  A. Tubular adenoma.    carissa/pkm         Lab Results   Component Value Date    HGB 16.0 06/01/2022    MCV 92 06/01/2022     06/01/2022    ALT 15 06/01/2022    AST 18 06/01/2022    HGBA1C 5.6 12/29/2014    TRIG 82 08/17/2020    FERRITIN 212 06/01/2022    TIBC 318 06/01/2022      No results found.    REVIEW OF SYSTEMS  Review of Systems   Constitutional: Negative.    HENT: Negative.    Eyes: Negative.    Respiratory: Negative.    Cardiovascular: Negative.    Gastrointestinal:        Acid Reflux    Endocrine: Negative.    Genitourinary: Negative.    Musculoskeletal: Positive for arthralgias, back pain and joint swelling.   Skin: Negative.    Allergic/Immunologic: Negative.    Neurological: Negative.    Hematological: Negative.    Psychiatric/Behavioral: Negative.          ACTIVE PROBLEMS  Patient Active Problem List    Diagnosis    • History of GI diverticular bleed [Z87.19]    • Diverticulosis [K57.90]    • Casanova's esophagus without dysplasia [K22.70]    • Osteoarthritis of knee [M17.9]    • Vitamin D deficiency [E55.9]    • CRISTI on CPAP [G47.33, Z99.89]    • Lyme disease [A69.20]    • GERD (gastroesophageal reflux disease) [K21.9]    • BPH with obstruction/lower urinary tract symptoms [N40.1, N13.8]    • Psoriasis [L40.9]          PAST MEDICAL HISTORY  Past Medical History:   Diagnosis Date   • Casanova esophagus    • Colon polyp    • Elevated PSA    • GERD (gastroesophageal  "reflux disease)          SURGICAL HISTORY  Past Surgical History:   Procedure Laterality Date   • ANKLE SURGERY      Fracture. Pins placed    • COLONOSCOPY     • HAND SURGERY      both hands   • KNEE ACL RECONSTRUCTION           FAMILY HISTORY  Family History   Problem Relation Age of Onset   • Heart failure Father    • Diabetes Father    • Obesity Father    • Colon polyps Father    • Colon cancer Neg Hx          SOCIAL HISTORY  Social History     Occupational History   • Not on file   Tobacco Use   • Smoking status: Never   • Smokeless tobacco: Never   Vaping Use   • Vaping Use: Never used   Substance and Sexual Activity   • Alcohol use: No   • Drug use: No   • Sexual activity: Defer         CURRENT MEDICATIONS    Current Outpatient Medications:   •  LORATADINE ALLERGY RELIEF PO, Take  by mouth., Disp: , Rfl:   •  omeprazole (priLOSEC) 40 MG capsule, Take 1 capsule by mouth Daily., Disp: 30 capsule, Rfl: 0  •  OTEZLA 30 MG tablet, , Disp: , Rfl:   •  sodium chloride 0.65 % nasal spray, 1 spray into the nostril(s) as directed by provider As Needed for Congestion., Disp: , Rfl:   •  tamsulosin (FLOMAX) 0.4 MG capsule 24 hr capsule, Take 1 capsule by mouth Every Night., Disp: , Rfl:   •  triamcinolone (KENALOG) 0.1 % cream, by Intratympanic route., Disp: , Rfl:     ALLERGIES  Patient has no known allergies.    VITALS  Vitals:    10/25/22 1534   BP: 122/84   BP Location: Right arm   Patient Position: Sitting   Cuff Size: Adult   Weight: 95.3 kg (210 lb 3.2 oz)   Height: 170.2 cm (67.01\")       PHYSICAL EXAM  Debilities/Disabilities Identified: None  Emotional Behavior: Appropriate  Wt Readings from Last 3 Encounters:   10/25/22 95.3 kg (210 lb 3.2 oz)   06/01/22 92.5 kg (204 lb)   07/10/21 90.7 kg (200 lb)     Ht Readings from Last 1 Encounters:   10/25/22 170.2 cm (67.01\")     Body mass index is 32.91 kg/m².  Physical Exam  Constitutional:       General: He is not in acute distress.     Appearance: Normal appearance. " He is not ill-appearing.   HENT:      Head: Normocephalic and atraumatic.      Mouth/Throat:      Mouth: Mucous membranes are moist.      Pharynx: No posterior oropharyngeal erythema.   Eyes:      General: No scleral icterus.  Cardiovascular:      Rate and Rhythm: Normal rate and regular rhythm.      Pulses: Normal pulses.      Heart sounds: Normal heart sounds.   Pulmonary:      Effort: Pulmonary effort is normal.      Breath sounds: Normal breath sounds.   Abdominal:      General: Abdomen is flat. Bowel sounds are normal. There is no distension.      Palpations: Abdomen is soft. There is no mass.      Tenderness: There is abdominal tenderness in the left upper quadrant. There is no guarding or rebound. Negative signs include Chilel's sign.      Hernia: No hernia is present.   Skin:     General: Skin is warm.      Capillary Refill: Capillary refill takes less than 2 seconds.   Neurological:      General: No focal deficit present.      Mental Status: He is alert.   Psychiatric:         Mood and Affect: Mood normal.         Behavior: Behavior normal.         Thought Content: Thought content normal.         Judgment: Judgment normal.         CLINICAL DATA REVIEWED   reviewed previous lab results and integrated with today's visit, reviewed notes from other physicians and/or last GI encounter, reviewed previous endoscopy results and available photos, reviewed surgical pathology results from previous biopsies    ASSESSMENT  Diagnoses and all orders for this visit:    Casanova's esophagus without dysplasia          PLAN  Increase Omeprazole to BID. If no improvement at f/u, check HP stool.    Add fiber to bowels to regulate better 5-10g water soluble.    Return in about 3 months (around 1/25/2023).    I have discussed the above plan with the patient.  They verbalize understanding and are in agreement with the plan.  They have been advised to contact the office for any questions, concerns, or changes related to their  health.

## 2023-02-07 ENCOUNTER — LAB (OUTPATIENT)
Dept: LAB | Facility: HOSPITAL | Age: 63
End: 2023-02-07
Payer: COMMERCIAL

## 2023-02-07 ENCOUNTER — OFFICE VISIT (OUTPATIENT)
Dept: GASTROENTEROLOGY | Facility: CLINIC | Age: 63
End: 2023-02-07
Payer: COMMERCIAL

## 2023-02-07 VITALS
DIASTOLIC BLOOD PRESSURE: 70 MMHG | HEIGHT: 67 IN | BODY MASS INDEX: 33.37 KG/M2 | WEIGHT: 212.6 LBS | SYSTOLIC BLOOD PRESSURE: 112 MMHG

## 2023-02-07 DIAGNOSIS — K57.90 DIVERTICULOSIS: ICD-10-CM

## 2023-02-07 DIAGNOSIS — K22.70 BARRETT'S ESOPHAGUS WITHOUT DYSPLASIA: Primary | ICD-10-CM

## 2023-02-07 DIAGNOSIS — R10.12 LUQ PAIN: ICD-10-CM

## 2023-02-07 DIAGNOSIS — K21.00 GASTROESOPHAGEAL REFLUX DISEASE WITH ESOPHAGITIS WITHOUT HEMORRHAGE: ICD-10-CM

## 2023-02-07 PROCEDURE — 82784 ASSAY IGA/IGD/IGG/IGM EACH: CPT

## 2023-02-07 PROCEDURE — 86258 DGP ANTIBODY EACH IG CLASS: CPT

## 2023-02-07 PROCEDURE — 86677 HELICOBACTER PYLORI ANTIBODY: CPT

## 2023-02-07 PROCEDURE — 86231 EMA EACH IG CLASS: CPT

## 2023-02-07 PROCEDURE — 36415 COLL VENOUS BLD VENIPUNCTURE: CPT

## 2023-02-07 PROCEDURE — 86364 TISS TRNSGLTMNASE EA IG CLAS: CPT

## 2023-02-07 PROCEDURE — 99213 OFFICE O/P EST LOW 20 MIN: CPT

## 2023-02-07 NOTE — PROGRESS NOTES
"    PATIENT INFORMATION  Trent Olvera       - 1960    CHIEF COMPLAINT  Chief Complaint   Patient presents with   • Barretts Esophagus       HISTORY OF PRESENT ILLNESS    Here today for Barretts follow-up.     Per LOV: Continuing on daily omeprazole, but feels heart burn towards end of the day, especially if eating the wrong thing. No OTC antacids. Has mostly stopped NSAIDs and started tumeric which has helped with arthritis. Was taking frequently, but now 2-3 times per week. No dysphagia, odynophagia, nausea, vomiting, bloating, belching. Mother with \"digestive issues.\"  Encouraged to use tylenol arthritis regularly. Increased PPI to BID. Today: No pattern to the type of food, but pizza does make worse. 3 times a week. No early satiety, does not eat as much as he used to. Says he has \"acid\" in stomach but not pain, discomfort, pressure, burning, nausea. Unable to put into words. Responds to pepcid.      Last colon and EGD 20 with Barretts and 4 of 5 adenomas. Recall in for both in 3 years.     Wife complaining of foul gas. 2-3 times solid stools. No diarrhea, bleeding, mucous, abdominal pain, bloating. Last antibiotics in July for sinus infection. Recommended to start fiber 5-10 g a day. Did not start fiber supplement. Bowels a little better.    Wife has celiac, so does avoid gluten.      REVIEWED PERTINENT RESULTS/ LABS  Lab Results   Component Value Date    CASEREPORT  2020     Surgical Pathology Report                         Case: [id number]                                  Authorizing Provider:  Bong Cuadra        Collected:           2020 01:28 PM                                 MD Irena                                                                   Ordering Location:     [facility]  Received:            2020 03:11 PM                                 LABORATORY                                                                   Pathologist:           " Trent Finch MD                                                         Specimens:   1) - Esophagus, Distal, distal esophagus                                                            2) - Large Intestine, Right / Ascending Colon, ascending colon polyp                                3) - Large Intestine, Transverse Colon, transverse colon polyp x3                                   4) - Large Intestine, Sigmoid Colon, sigmoid polyp                                         FINALDX  09/18/2020     1. Esophagus, Distal, Biopsy: Benign squamous and gastric mucosa with  A. Intestinal metaplasia consistent with Casanova's esophagus with reactive changes and no dysplasia.    2. Colon, Right Ascending, Biopsy:   A. Hyperplastic polyp.    3. Colon, Transverse, Biopsy:   A. Tubular adenomas.    4. Colon, Sigmoid, Biopsy:  A. Tubular adenoma.    carissa/pkm         Lab Results   Component Value Date    HGB 16.0 06/01/2022    MCV 92 06/01/2022     06/01/2022    ALT 15 06/01/2022    AST 18 06/01/2022    HGBA1C 5.6 12/29/2014    TRIG 82 08/17/2020    FERRITIN 212 06/01/2022    TIBC 318 06/01/2022      No results found.    REVIEW OF SYSTEMS  Review of Systems   Constitutional: Positive for fatigue (lyme disease).   HENT: Negative.    Eyes: Negative.    Respiratory: Negative.    Gastrointestinal: Positive for abdominal distention.        Barretts esophagus   Endocrine: Negative.    Genitourinary: Positive for difficulty urinating (taking flomax).   Musculoskeletal: Negative.    Skin: Negative.    Allergic/Immunologic: Negative.    Neurological: Negative.    Hematological: Negative.    Psychiatric/Behavioral: Negative.          ACTIVE PROBLEMS  Patient Active Problem List    Diagnosis    • History of GI diverticular bleed [Z87.19]    • Diverticulosis [K57.90]    • Casanvoa's esophagus without dysplasia [K22.70]    • Osteoarthritis of knee [M17.9]    • Vitamin D deficiency [E55.9]    • CRISTI on CPAP [G47.33, Z99.89]    • Lyme  "disease [A69.20]    • GERD (gastroesophageal reflux disease) [K21.9]    • BPH with obstruction/lower urinary tract symptoms [N40.1, N13.8]    • Psoriasis [L40.9]          PAST MEDICAL HISTORY  Past Medical History:   Diagnosis Date   • Casanova esophagus    • Colon polyp    • Elevated PSA    • GERD (gastroesophageal reflux disease)          SURGICAL HISTORY  Past Surgical History:   Procedure Laterality Date   • ANKLE SURGERY      Fracture. Pins placed    • COLONOSCOPY     • HAND SURGERY      both hands   • KNEE ACL RECONSTRUCTION           FAMILY HISTORY  Family History   Problem Relation Age of Onset   • Heart failure Father    • Diabetes Father    • Obesity Father    • Colon polyps Father    • Colon cancer Neg Hx          SOCIAL HISTORY  Social History     Occupational History   • Not on file   Tobacco Use   • Smoking status: Never   • Smokeless tobacco: Never   Vaping Use   • Vaping Use: Never used   Substance and Sexual Activity   • Alcohol use: No   • Drug use: No   • Sexual activity: Defer         CURRENT MEDICATIONS    Current Outpatient Medications:   •  LORATADINE ALLERGY RELIEF PO, Take  by mouth., Disp: , Rfl:   •  omeprazole (priLOSEC) 40 MG capsule, Take 1 capsule by mouth 2 (Two) Times a Day., Disp: 180 capsule, Rfl: 3  •  OTEZLA 30 MG tablet, , Disp: , Rfl:   •  sodium chloride 0.65 % nasal spray, 1 spray into the nostril(s) as directed by provider As Needed for Congestion., Disp: , Rfl:   •  tamsulosin (FLOMAX) 0.4 MG capsule 24 hr capsule, Take 1 capsule by mouth Every Night., Disp: , Rfl:   •  triamcinolone (KENALOG) 0.1 % cream, by Intratympanic route., Disp: , Rfl:     ALLERGIES  Patient has no known allergies.    VITALS  Vitals:    02/07/23 1335   BP: 112/70   BP Location: Left arm   Patient Position: Sitting   Cuff Size: Adult   Weight: 96.4 kg (212 lb 9.6 oz)   Height: 170.2 cm (67.01\")       PHYSICAL EXAM  Debilities/Disabilities Identified: None  Emotional Behavior: Appropriate  Wt Readings " "from Last 3 Encounters:   02/07/23 96.4 kg (212 lb 9.6 oz)   10/25/22 95.3 kg (210 lb 3.2 oz)   06/01/22 92.5 kg (204 lb)     Ht Readings from Last 1 Encounters:   02/07/23 170.2 cm (67.01\")     Body mass index is 33.29 kg/m².  Physical Exam  Constitutional:       General: He is not in acute distress.     Appearance: Normal appearance. He is not ill-appearing.   HENT:      Head: Normocephalic and atraumatic.      Mouth/Throat:      Mouth: Mucous membranes are moist.      Pharynx: No posterior oropharyngeal erythema.   Eyes:      General: No scleral icterus.  Cardiovascular:      Rate and Rhythm: Normal rate and regular rhythm.      Heart sounds: Normal heart sounds.   Pulmonary:      Effort: Pulmonary effort is normal.      Breath sounds: Normal breath sounds.   Abdominal:      General: Abdomen is flat. Bowel sounds are normal. There is no distension.      Palpations: Abdomen is soft. There is no mass.      Tenderness: There is no abdominal tenderness. There is no guarding or rebound. Negative signs include Chilel's sign.      Hernia: No hernia is present.   Musculoskeletal:      Cervical back: Neck supple.   Skin:     General: Skin is warm.      Capillary Refill: Capillary refill takes less than 2 seconds.   Neurological:      General: No focal deficit present.      Mental Status: He is alert and oriented to person, place, and time.   Psychiatric:         Mood and Affect: Mood normal.         Behavior: Behavior normal.         Thought Content: Thought content normal.         Judgment: Judgment normal.         CLINICAL DATA REVIEWED   reviewed previous lab results and integrated with today's visit, reviewed notes from other physicians and/or last GI encounter, reviewed previous endoscopy results and available photos, reviewed surgical pathology results from previous biopsies    ASSESSMENT  Diagnoses and all orders for this visit:    Casanova's esophagus without dysplasia    Gastroesophageal reflux disease with " esophagitis without hemorrhage  -     Helicobacter Pylori, IgA IgG IgM; Future  -     Celiac Comprehensive Panel    Diverticulosis    LUQ pain  -     Helicobacter Pylori, IgA IgG IgM; Future  -     Celiac Comprehensive Panel          PLAN    Check HP and Celiac  Add daily fiber supplement    Return in about 3 months (around 5/7/2023).    I have discussed the above plan with the patient.  They verbalize understanding and are in agreement with the plan.  They have been advised to contact the office for any questions, concerns, or changes related to their health.

## 2023-02-08 LAB
ENDOMYSIUM IGA SER QL: NEGATIVE
GLIADIN PEPTIDE IGA SER-ACNC: 3 UNITS (ref 0–19)
GLIADIN PEPTIDE IGG SER-ACNC: 4 UNITS (ref 0–19)
H PYLORI IGA SER-ACNC: <9 UNITS (ref 0–8.9)
H PYLORI IGG SER IA-ACNC: 0.2 INDEX VALUE (ref 0–0.79)
H PYLORI IGM SER-ACNC: <9 UNITS (ref 0–8.9)
IGA SERPL-MCNC: 96 MG/DL (ref 61–437)
TTG IGA SER-ACNC: <2 U/ML (ref 0–3)
TTG IGG SER-ACNC: 5 U/ML (ref 0–5)

## 2023-05-08 ENCOUNTER — OFFICE VISIT (OUTPATIENT)
Dept: GASTROENTEROLOGY | Facility: CLINIC | Age: 63
End: 2023-05-08
Payer: COMMERCIAL

## 2023-05-08 VITALS
WEIGHT: 209 LBS | SYSTOLIC BLOOD PRESSURE: 110 MMHG | BODY MASS INDEX: 32.8 KG/M2 | HEIGHT: 67 IN | DIASTOLIC BLOOD PRESSURE: 74 MMHG

## 2023-05-08 DIAGNOSIS — K22.70 BARRETT'S ESOPHAGUS WITHOUT DYSPLASIA: ICD-10-CM

## 2023-05-08 DIAGNOSIS — K57.90 DIVERTICULOSIS: ICD-10-CM

## 2023-05-08 DIAGNOSIS — K21.00 GASTROESOPHAGEAL REFLUX DISEASE WITH ESOPHAGITIS WITHOUT HEMORRHAGE: Primary | ICD-10-CM

## 2023-05-08 DIAGNOSIS — Z86.010 PERSONAL HISTORY OF COLONIC POLYPS: ICD-10-CM

## 2023-05-08 PROCEDURE — 99214 OFFICE O/P EST MOD 30 MIN: CPT

## 2023-05-08 NOTE — PROGRESS NOTES
"    PATIENT INFORMATION  Trent Olvera       - 1960    CHIEF COMPLAINT  Chief Complaint   Patient presents with   • Casanova's Esophagus    • Heartburn   • Diverticulosis       HISTORY OF PRESENT ILLNESS    Here today for Barretts follow-up.      Per LOV: Continuing on daily omeprazole, but feels heart burn towards end of the day, especially if eating the wrong thing. No OTC antacids. Has mostly stopped NSAIDs and started tumeric which has helped with arthritis. Was taking frequently, but now 2-3 times per week. No dysphagia, odynophagia, nausea, vomiting, bloating, belching. Mother with \"digestive issues.\"  Encouraged to use tylenol arthritis regularly. Increased PPI to BID. Today: No pattern to the type of food, but pizza does make worse. 3 times a week. No early satiety, does not eat as much as he used to. Says he has \"acid\" in stomach but not pain, discomfort, pressure, burning, nausea. Unable to put into words. Responds to pepcid, but has not needed in several weeks, usually needs it after dietary indiscretions. Working on healthier diet, minimizing sugars, sodas etc.     Last colon and EGD 20 with Barretts and 4 of 5 adenomas. Recall in for both in 3 years.     Wife complaining of foul gas. 2-3 times solid stools. No diarrhea, bleeding, mucous, abdominal pain, bloating. Last antibiotics in July for sinus infection. Recommended to start fiber 5-10 g a day. Did not start fiber supplement. Bowels a little better. Keeps forgetting to buy fiber supplement.     Wife has celiac, so does avoid gluten.      REVIEWED PERTINENT RESULTS/ LABS  Lab Results   Component Value Date    CASEREPORT  2020     Surgical Pathology Report                         Case: [id number]                                  Authorizing Provider:  Bong Cuadra        Collected:           2020 01:28 PM                                 MD Irena                                                                 "   Ordering Location:     [facility]  Received:            09/18/2020 03:11 PM                                 LABORATORY                                                                   Pathologist:           Trent Finch MD                                                         Specimens:   1) - Esophagus, Distal, distal esophagus                                                            2) - Large Intestine, Right / Ascending Colon, ascending colon polyp                                3) - Large Intestine, Transverse Colon, transverse colon polyp x3                                   4) - Large Intestine, Sigmoid Colon, sigmoid polyp                                         FINALDX  09/18/2020     1. Esophagus, Distal, Biopsy: Benign squamous and gastric mucosa with  A. Intestinal metaplasia consistent with Casanova's esophagus with reactive changes and no dysplasia.    2. Colon, Right Ascending, Biopsy:   A. Hyperplastic polyp.    3. Colon, Transverse, Biopsy:   A. Tubular adenomas.    4. Colon, Sigmoid, Biopsy:  A. Tubular adenoma.    carissa/pkm         Lab Results   Component Value Date    HGB 16.0 06/01/2022    MCV 92 06/01/2022     06/01/2022    ALT 15 06/01/2022    AST 18 06/01/2022    HGBA1C 5.6 12/29/2014    TRIG 82 08/17/2020    FERRITIN 212 06/01/2022    TIBC 318 06/01/2022      No results found.    REVIEW OF SYSTEMS  Review of Systems   Constitutional: Negative.    HENT: Negative.    Eyes: Negative.    Respiratory: Negative.    Cardiovascular: Negative.    Gastrointestinal: Negative for abdominal pain, constipation, diarrhea, nausea and vomiting.        Casanova's, GERD, Diverticulosis   Endocrine: Negative.    Genitourinary: Positive for difficulty urinating (managed by medication ).   Musculoskeletal: Negative.    Skin: Negative.    Allergic/Immunologic: Negative.    Neurological: Negative.    Hematological: Negative.    Psychiatric/Behavioral: Negative.          ACTIVE  PROBLEMS  Patient Active Problem List    Diagnosis    • History of GI diverticular bleed [Z87.19]    • Diverticulosis [K57.90]    • Casanova's esophagus without dysplasia [K22.70]    • Osteoarthritis of knee [M17.9]    • Vitamin D deficiency [E55.9]    • CRISTI on CPAP [G47.33, Z99.89]    • Lyme disease [A69.20]    • GERD (gastroesophageal reflux disease) [K21.9]    • BPH with obstruction/lower urinary tract symptoms [N40.1, N13.8]    • Psoriasis [L40.9]          PAST MEDICAL HISTORY  Past Medical History:   Diagnosis Date   • Casanova esophagus    • Colon polyp    • Elevated PSA    • GERD (gastroesophageal reflux disease)          SURGICAL HISTORY  Past Surgical History:   Procedure Laterality Date   • ANKLE SURGERY      Fracture. Pins placed    • COLONOSCOPY     • HAND SURGERY      both hands   • KNEE ACL RECONSTRUCTION           FAMILY HISTORY  Family History   Problem Relation Age of Onset   • Heart failure Father    • Diabetes Father    • Obesity Father    • Colon polyps Father    • Colon cancer Neg Hx          SOCIAL HISTORY  Social History     Occupational History   • Not on file   Tobacco Use   • Smoking status: Never   • Smokeless tobacco: Never   Vaping Use   • Vaping Use: Never used   Substance and Sexual Activity   • Alcohol use: No   • Drug use: No   • Sexual activity: Defer         CURRENT MEDICATIONS    Current Outpatient Medications:   •  LORATADINE ALLERGY RELIEF PO, Take  by mouth., Disp: , Rfl:   •  omeprazole (priLOSEC) 40 MG capsule, Take 1 capsule by mouth 2 (Two) Times a Day., Disp: 180 capsule, Rfl: 3  •  OTEZLA 30 MG tablet, , Disp: , Rfl:   •  sodium chloride 0.65 % nasal spray, 1 spray into the nostril(s) as directed by provider As Needed for Congestion., Disp: , Rfl:   •  tamsulosin (FLOMAX) 0.4 MG capsule 24 hr capsule, Take 1 capsule by mouth Every Night., Disp: , Rfl:   •  triamcinolone (KENALOG) 0.1 % cream, by Intratympanic route., Disp: , Rfl:     ALLERGIES  Patient has no known  "allergies.    VITALS  Vitals:    05/08/23 1318   BP: 110/74   BP Location: Left arm   Patient Position: Sitting   Cuff Size: Adult   Weight: 94.8 kg (209 lb)   Height: 170.2 cm (67.01\")       PHYSICAL EXAM  Debilities/Disabilities Identified: None  Emotional Behavior: Appropriate  Wt Readings from Last 3 Encounters:   05/08/23 94.8 kg (209 lb)   02/07/23 96.4 kg (212 lb 9.6 oz)   10/25/22 95.3 kg (210 lb 3.2 oz)     Ht Readings from Last 1 Encounters:   05/08/23 170.2 cm (67.01\")     Body mass index is 32.73 kg/m².  Physical Exam  Constitutional:       General: He is not in acute distress.     Appearance: Normal appearance. He is not ill-appearing.   HENT:      Head: Normocephalic and atraumatic.      Mouth/Throat:      Mouth: Mucous membranes are moist.      Pharynx: No posterior oropharyngeal erythema.   Eyes:      General: No scleral icterus.  Cardiovascular:      Rate and Rhythm: Normal rate and regular rhythm.      Heart sounds: Normal heart sounds.   Pulmonary:      Effort: Pulmonary effort is normal.      Breath sounds: Normal breath sounds.   Abdominal:      General: Abdomen is flat. Bowel sounds are normal. There is no distension.      Palpations: Abdomen is soft. There is no mass.      Tenderness: There is no abdominal tenderness. There is no guarding or rebound. Negative signs include Chilel's sign.      Hernia: No hernia is present.   Musculoskeletal:      Cervical back: Neck supple.   Skin:     General: Skin is warm.      Capillary Refill: Capillary refill takes less than 2 seconds.   Neurological:      General: No focal deficit present.      Mental Status: He is alert and oriented to person, place, and time.   Psychiatric:         Mood and Affect: Mood normal.         Behavior: Behavior normal.         Thought Content: Thought content normal.         Judgment: Judgment normal.         CLINICAL DATA REVIEWED   reviewed previous lab results and integrated with today's visit, reviewed notes from other " physicians and/or last GI encounter, reviewed previous endoscopy results and available photos, reviewed surgical pathology results from previous biopsies    ASSESSMENT  Diagnoses and all orders for this visit:    Gastroesophageal reflux disease with esophagitis without hemorrhage    Casanova's esophagus without dysplasia  -     Case Request; Standing  -     Case Request    Diverticulosis    Personal history of colonic polyps  -     Case Request; Standing  -     Case Request    Other orders  -     Follow Anesthesia Guidelines / Protocol; Future  -     Obtain Informed Consent; Standing          PLAN    EGD and Colon in September  Continue BID PPI    Return in about 6 months (around 11/8/2023).    I have discussed the above plan with the patient.  They verbalize understanding and are in agreement with the plan.  They have been advised to contact the office for any questions, concerns, or changes related to their health.

## 2023-05-10 PROBLEM — Z86.010 PERSONAL HISTORY OF COLONIC POLYPS: Status: ACTIVE | Noted: 2023-05-10

## 2023-05-10 PROBLEM — Z86.0100 PERSONAL HISTORY OF COLONIC POLYPS: Status: ACTIVE | Noted: 2023-05-10

## 2023-09-18 ENCOUNTER — TELEPHONE (OUTPATIENT)
Dept: GASTROENTEROLOGY | Facility: CLINIC | Age: 63
End: 2023-09-18

## 2023-09-18 NOTE — TELEPHONE ENCOUNTER
Caller: Trent Olvera    Relationship to patient: Self    Best call back number: [phone]     Type of visit: EGD    Requested date: AFTER THE FIRST OF YEAR     If rescheduling, when is the original appointment: 9/26/23    Additional notes: PT CALLED AND NEEDS TO CANCEL SCOPE AND R/S FOR THE FIRST OF THE YEAR. PT REQUEST A CALL BACK TO R/S. CALL BACK ANYTIME OK TO LVM.

## 2023-09-19 NOTE — TELEPHONE ENCOUNTER
Spoke with patient.  Hard to hear with loud background noise.    Rescheduled to [facility] 02/02/2024 at 11:45am - arrive 10:30am.  Will send new instructions.

## 2023-11-28 ENCOUNTER — OFFICE VISIT (OUTPATIENT)
Dept: GASTROENTEROLOGY | Facility: CLINIC | Age: 63
End: 2023-11-28
Payer: COMMERCIAL

## 2023-11-28 VITALS
SYSTOLIC BLOOD PRESSURE: 98 MMHG | BODY MASS INDEX: 33.71 KG/M2 | DIASTOLIC BLOOD PRESSURE: 70 MMHG | WEIGHT: 214.8 LBS | HEIGHT: 67 IN

## 2023-11-28 DIAGNOSIS — K22.70 BARRETT'S ESOPHAGUS WITHOUT DYSPLASIA: Primary | ICD-10-CM

## 2023-11-28 DIAGNOSIS — Z86.010 PERSONAL HISTORY OF COLONIC POLYPS: ICD-10-CM

## 2023-11-28 DIAGNOSIS — K57.90 DIVERTICULOSIS: ICD-10-CM

## 2023-11-28 PROCEDURE — 99213 OFFICE O/P EST LOW 20 MIN: CPT | Performed by: INTERNAL MEDICINE

## 2023-11-28 RX ORDER — OMEPRAZOLE 40 MG/1
40 CAPSULE, DELAYED RELEASE ORAL 2 TIMES DAILY
Qty: 180 CAPSULE | Refills: 3 | Status: SHIPPED | OUTPATIENT
Start: 2023-11-28

## 2023-11-28 RX ORDER — FLUOCINOLONE ACETONIDE 0.11 MG/ML
OIL AURICULAR (OTIC)
COMMUNITY
Start: 2023-08-31

## 2023-11-28 NOTE — PROGRESS NOTES
PATIENT INFORMATION  Trent AHUJA Olvera       - 1960    CHIEF COMPLAINT  Chief Complaint   Patient presents with    Heartburn    Casanova's esophagus    Diverticulosis       HISTORY OF PRESENT ILLNESS  Regular BMs and still hasn't ever bought any fiber but he feels things are good    HB is controlled and only has issues with dietary indescretions  (Wings at 10PM)    Is on schedule for his double for polyps and SSBE         REVIEWED PERTINENT RESULTS/ LABS  Lab Results   Component Value Date    CASEREPORT  2020     Surgical Pathology Report                         Case: [id number]                                  Authorizing Provider:  Bong Cuadra        Collected:           2020 01:28 PM                                 MD Irena                                                                   Ordering Location:     [facility]  Received:            2020 03:11 PM                                 LABORATORY                                                                   Pathologist:           Trent Finch MD                                                         Specimens:   1) - Esophagus, Distal, distal esophagus                                                            2) - Large Intestine, Right / Ascending Colon, ascending colon polyp                                3) - Large Intestine, Transverse Colon, transverse colon polyp x3                                   4) - Large Intestine, Sigmoid Colon, sigmoid polyp                                         FINALDX  2020     1. Esophagus, Distal, Biopsy: Benign squamous and gastric mucosa with  A. Intestinal metaplasia consistent with Casanova's esophagus with reactive changes and no dysplasia.    2. Colon, Right Ascending, Biopsy:   A. Hyperplastic polyp.    3. Colon, Transverse, Biopsy:   A. Tubular adenomas.    4. Colon, Sigmoid, Biopsy:  A. Tubular adenoma.    carissa/pkm         Lab Results   Component  Value Date    HGB 16.0 06/01/2022    MCV 92 06/01/2022     06/01/2022    ALT 15 06/01/2022    AST 18 06/01/2022    HGBA1C 5.6 12/29/2014    TRIG 82 08/17/2020    FERRITIN 212 06/01/2022    TIBC 318 06/01/2022      No results found.    REVIEW OF SYSTEMS  Review of Systems   Constitutional:  Negative for activity change, chills, fever and unexpected weight change.   HENT:  Negative for congestion.    Eyes:  Negative for visual disturbance.   Respiratory:  Negative for shortness of breath.    Cardiovascular:  Negative for chest pain and palpitations.   Gastrointestinal:  Negative for abdominal pain and blood in stool.   Endocrine: Negative for cold intolerance and heat intolerance.   Genitourinary:  Negative for hematuria.   Musculoskeletal:  Negative for gait problem.   Skin:  Negative for color change.   Allergic/Immunologic: Negative for immunocompromised state.   Neurological:  Negative for weakness and light-headedness.   Hematological:  Negative for adenopathy.   Psychiatric/Behavioral:  Negative for sleep disturbance. The patient is not nervous/anxious.          ACTIVE PROBLEMS  Patient Active Problem List    Diagnosis     Personal history of colonic polyps [Z86.010]     History of GI diverticular bleed [Z87.19]     Diverticulosis [K57.90]     Casanova's esophagus without dysplasia [K22.70]     Osteoarthritis of knee [M17.9]     Vitamin D deficiency [E55.9]     CRISTI on CPAP [G47.33]     Lyme disease [A69.20]     GERD (gastroesophageal reflux disease) [K21.9]     BPH with obstruction/lower urinary tract symptoms [N40.1, N13.8]     Psoriasis [L40.9]          PAST MEDICAL HISTORY  Past Medical History:   Diagnosis Date    Casanova esophagus     Colon polyp     Elevated PSA     GERD (gastroesophageal reflux disease)          SURGICAL HISTORY  Past Surgical History:   Procedure Laterality Date    ANKLE SURGERY      Fracture. Pins placed     COLONOSCOPY      HAND SURGERY      both hands    KNEE ACL RECONSTRUCTION   "         FAMILY HISTORY  Family History   Problem Relation Age of Onset    Heart failure Father     Diabetes Father     Obesity Father     Colon polyps Father     Colon cancer Neg Hx          SOCIAL HISTORY  Social History     Occupational History    Not on file   Tobacco Use    Smoking status: Never     Passive exposure: Never    Smokeless tobacco: Never   Vaping Use    Vaping Use: Never used   Substance and Sexual Activity    Alcohol use: No    Drug use: No    Sexual activity: Defer         CURRENT MEDICATIONS    Current Outpatient Medications:     fluocinolone acetonide (DERMOTIC) 0.01 % oil otic oil, INSTILL 4 DROPS TO LEFT EAR TWICE DAILY FOR 7 DAYS. MAY REPEAT AS NEEDED ITCHING FOR 5-7 DAYS, Disp: , Rfl:     LORATADINE ALLERGY RELIEF PO, Take  by mouth., Disp: , Rfl:     omeprazole (priLOSEC) 40 MG capsule, Take 1 capsule by mouth 2 (Two) Times a Day., Disp: 180 capsule, Rfl: 3    OTEZLA 30 MG tablet, , Disp: , Rfl:     sodium chloride 0.65 % nasal spray, 1 spray into the nostril(s) as directed by provider As Needed for Congestion., Disp: , Rfl:     tamsulosin (FLOMAX) 0.4 MG capsule 24 hr capsule, Take 1 capsule by mouth Every Night., Disp: , Rfl:     triamcinolone (KENALOG) 0.1 % cream, by Intratympanic route As Needed., Disp: , Rfl:     ALLERGIES  Patient has no known allergies.    VITALS  Vitals:    11/28/23 0908   BP: 98/70   BP Location: Left arm   Patient Position: Sitting   Cuff Size: Large Adult   Weight: 97.4 kg (214 lb 12.8 oz)   Height: 170.2 cm (67\")       PHYSICAL EXAM  Debilities/Disabilities Identified: None  Emotional Behavior: Appropriate  Wt Readings from Last 3 Encounters:   11/28/23 97.4 kg (214 lb 12.8 oz)   06/19/23 90.7 kg (200 lb)   05/08/23 94.8 kg (209 lb)     Ht Readings from Last 1 Encounters:   11/28/23 170.2 cm (67\")     Body mass index is 33.64 kg/m².  Physical Exam  Constitutional:       Appearance: He is well-developed. He is not diaphoretic.   HENT:      Head: Normocephalic " and atraumatic.   Eyes:      General: No scleral icterus.     Conjunctiva/sclera: Conjunctivae normal.      Pupils: Pupils are equal, round, and reactive to light.   Neck:      Thyroid: No thyromegaly.   Cardiovascular:      Rate and Rhythm: Normal rate and regular rhythm.      Heart sounds: Normal heart sounds. No murmur heard.     No gallop.   Pulmonary:      Effort: Pulmonary effort is normal.      Breath sounds: Normal breath sounds. No wheezing or rales.   Abdominal:      General: Bowel sounds are normal. There is no distension or abdominal bruit.      Palpations: Abdomen is soft. There is no shifting dullness, fluid wave or mass.      Tenderness: There is no abdominal tenderness. There is no guarding. Negative signs include Chilel's sign.      Hernia: There is no hernia in the ventral area.   Musculoskeletal:         General: Normal range of motion.      Cervical back: Normal range of motion and neck supple.   Lymphadenopathy:      Cervical: No cervical adenopathy.   Skin:     General: Skin is warm and dry.      Findings: No erythema or rash.   Neurological:      Mental Status: He is alert and oriented to person, place, and time.   Psychiatric:         Mood and Affect: Mood normal.         Behavior: Behavior normal.         CLINICAL DATA REVIEWED   reviewed previous lab results and integrated with today's visit, reviewed notes from other physicians and/or last GI encounter, reviewed previous endoscopy results and available photos, reviewed surgical pathology results from previous biopsies    ASSESSMENT  Diagnoses and all orders for this visit:    Casanova's esophagus without dysplasia  -     omeprazole (priLOSEC) 40 MG capsule; Take 1 capsule by mouth 2 (Two) Times a Day.    Personal history of colonic polyps    Diverticulosis    Other orders  -     fluocinolone acetonide (DERMOTIC) 0.01 % oil otic oil; INSTILL 4 DROPS TO LEFT EAR TWICE DAILY FOR 7 DAYS. MAY REPEAT AS NEEDED ITCHING FOR 5-7  DAYS          PLAN  Has his Double on the books for February  Return in about 1 year (around 11/28/2024).    I have discussed the above plan with the patient.  They verbalize understanding and are in agreement with the plan.  They have been advised to contact the office for any questions, concerns, or changes related to their health.

## 2024-01-24 ENCOUNTER — TELEPHONE (OUTPATIENT)
Dept: GASTROENTEROLOGY | Facility: CLINIC | Age: 64
End: 2024-01-24
Payer: COMMERCIAL

## 2024-01-24 NOTE — TELEPHONE ENCOUNTER
Called patient for new insurance information, which has been updated.    Explained scheduled for EGD & Colonoscopy on 02/02/2024 arrive 9:30am at [facility].  He states forgot all about this.  Not even on my calendar.  Explained prep instructions were mailed on 09/28/2023 per his request.    He asked for other copy.  Emailed updated copy prep instructions.  See LETTERS tab.    [email or web address]

## 2024-01-30 ENCOUNTER — TELEPHONE (OUTPATIENT)
Dept: GASTROENTEROLOGY | Facility: CLINIC | Age: 64
End: 2024-01-30
Payer: COMMERCIAL

## 2024-01-30 NOTE — TELEPHONE ENCOUNTER
Called to reschedule his procedure on 02/02/2024.  I have strep throat and on antibiotics.  Would like to stay on Friday at [facility].    Scheduled at [facility] 03/15/2024 at 2pm - arrive 1pm.  Will email prep instructions.    [email or web address]

## 2024-02-16 ENCOUNTER — TELEPHONE (OUTPATIENT)
Dept: GASTROENTEROLOGY | Facility: CLINIC | Age: 64
End: 2024-02-16
Payer: COMMERCIAL

## 2024-02-22 ENCOUNTER — TELEPHONE (OUTPATIENT)
Dept: GASTROENTEROLOGY | Facility: CLINIC | Age: 64
End: 2024-02-22
Payer: COMMERCIAL

## 2024-02-22 DIAGNOSIS — K22.70 BARRETT'S ESOPHAGUS WITHOUT DYSPLASIA: ICD-10-CM

## 2024-02-22 RX ORDER — OMEPRAZOLE 40 MG/1
40 CAPSULE, DELAYED RELEASE ORAL 2 TIMES DAILY
Qty: 180 CAPSULE | Refills: 0 | Status: SHIPPED | OUTPATIENT
Start: 2024-02-22

## 2024-02-22 NOTE — TELEPHONE ENCOUNTER
"    Caller: Trent Olvera \"HELEN\"    Relationship: Self    Best call back number: [phone]    Requested Prescriptions:   Requested Prescriptions      No prescriptions requested or ordered in this encounter    omeprazole (priLOSEC) 40 MG capsule [50073] (Order [id number])     Pharmacy where request should be sent:  [facility] [id number] [address]  [phone]  - [phone]      Last office visit with prescribing clinician: 11/28/2023   Last telemedicine visit with prescribing clinician: Visit date not found   Next office visit with prescribing clinician: Visit date not found         Does the patient have less than a 3 day supply:  [] Yes  [x] No    Would you like a call back once the refill request has been completed: [] Yes [x] No    If the office needs to give you a call back, can they leave a voicemail: [] Yes [x] No    Shelly Carrillo Rep   02/22/24 09:23 EST       "

## 2024-04-19 ENCOUNTER — HOSPITAL ENCOUNTER (OUTPATIENT)
Facility: SURGERY CENTER | Age: 64
Setting detail: HOSPITAL OUTPATIENT SURGERY
Discharge: HOME | End: 2024-04-19
Attending: INTERNAL MEDICINE | Admitting: INTERNAL MEDICINE
Payer: COMMERCIAL

## 2024-04-19 ENCOUNTER — ANESTHESIA EVENT (OUTPATIENT)
Dept: SURGERY | Facility: SURGERY CENTER | Age: 64
End: 2024-04-19
Payer: COMMERCIAL

## 2024-04-19 ENCOUNTER — ANESTHESIA (OUTPATIENT)
Dept: SURGERY | Facility: SURGERY CENTER | Age: 64
End: 2024-04-19
Payer: COMMERCIAL

## 2024-04-19 VITALS
HEIGHT: 67 IN | DIASTOLIC BLOOD PRESSURE: 62 MMHG | OXYGEN SATURATION: 92 % | SYSTOLIC BLOOD PRESSURE: 109 MMHG | RESPIRATION RATE: 18 BRPM | TEMPERATURE: 97.7 F | HEART RATE: 84 BPM | BODY MASS INDEX: 31.96 KG/M2 | WEIGHT: 203.6 LBS

## 2024-04-19 DIAGNOSIS — K22.70 BARRETT'S ESOPHAGUS WITHOUT DYSPLASIA: ICD-10-CM

## 2024-04-19 DIAGNOSIS — Z86.010 PERSONAL HISTORY OF COLONIC POLYPS: ICD-10-CM

## 2024-04-19 PROCEDURE — 25010000002 LIDOCAINE 1 % SOLUTION: Performed by: ANESTHESIOLOGY

## 2024-04-19 PROCEDURE — 88305 TISSUE EXAM BY PATHOLOGIST: CPT | Performed by: INTERNAL MEDICINE

## 2024-04-19 PROCEDURE — 45380 COLONOSCOPY AND BIOPSY: CPT | Performed by: INTERNAL MEDICINE

## 2024-04-19 PROCEDURE — 25010000002 PHENYLEPHRINE 10 MG/ML SOLUTION: Performed by: ANESTHESIOLOGY

## 2024-04-19 PROCEDURE — 25010000002 PROPOFOL 10 MG/ML EMULSION: Performed by: ANESTHESIOLOGY

## 2024-04-19 PROCEDURE — 25810000003 LACTATED RINGERS PER 1000 ML: Performed by: INTERNAL MEDICINE

## 2024-04-19 PROCEDURE — 25010000002 PROPOFOL 1000 MG/100ML EMULSION: Performed by: ANESTHESIOLOGY

## 2024-04-19 PROCEDURE — 43239 EGD BIOPSY SINGLE/MULTIPLE: CPT | Performed by: INTERNAL MEDICINE

## 2024-04-19 RX ORDER — SODIUM CHLORIDE, SODIUM LACTATE, POTASSIUM CHLORIDE, CALCIUM CHLORIDE 600; 310; 30; 20 MG/100ML; MG/100ML; MG/100ML; MG/100ML
1000 INJECTION, SOLUTION INTRAVENOUS CONTINUOUS
Status: DISCONTINUED | OUTPATIENT
Start: 2024-04-19 | End: 2024-04-19 | Stop reason: HOSPADM

## 2024-04-19 RX ORDER — PROPOFOL 10 MG/ML
INJECTION, EMULSION INTRAVENOUS AS NEEDED
Status: DISCONTINUED | OUTPATIENT
Start: 2024-04-19 | End: 2024-04-19 | Stop reason: SURG

## 2024-04-19 RX ORDER — LIDOCAINE HYDROCHLORIDE 10 MG/ML
0.5 INJECTION, SOLUTION INFILTRATION; PERINEURAL ONCE AS NEEDED
Status: COMPLETED | OUTPATIENT
Start: 2024-04-19 | End: 2024-04-19

## 2024-04-19 RX ORDER — PHENYLEPHRINE HYDROCHLORIDE 10 MG/ML
INJECTION INTRAVENOUS AS NEEDED
Status: DISCONTINUED | OUTPATIENT
Start: 2024-04-19 | End: 2024-04-19 | Stop reason: SURG

## 2024-04-19 RX ORDER — LIDOCAINE HYDROCHLORIDE 10 MG/ML
INJECTION, SOLUTION INFILTRATION; PERINEURAL AS NEEDED
Status: DISCONTINUED | OUTPATIENT
Start: 2024-04-19 | End: 2024-04-19 | Stop reason: SURG

## 2024-04-19 RX ORDER — SODIUM CHLORIDE 0.9 % (FLUSH) 0.9 %
10 SYRINGE (ML) INJECTION AS NEEDED
Status: DISCONTINUED | OUTPATIENT
Start: 2024-04-19 | End: 2024-04-19 | Stop reason: HOSPADM

## 2024-04-19 RX ADMIN — PROPOFOL 100 MG: 10 INJECTION, EMULSION INTRAVENOUS at 12:32

## 2024-04-19 RX ADMIN — LIDOCAINE HYDROCHLORIDE 30 MG: 10 INJECTION, SOLUTION INFILTRATION; PERINEURAL at 12:31

## 2024-04-19 RX ADMIN — LIDOCAINE HYDROCHLORIDE 0.5 ML: 10 INJECTION, SOLUTION EPIDURAL; INFILTRATION; INTRACAUDAL; PERINEURAL at 12:02

## 2024-04-19 RX ADMIN — SODIUM CHLORIDE, POTASSIUM CHLORIDE, SODIUM LACTATE AND CALCIUM CHLORIDE 1000 ML: 600; 310; 30; 20 INJECTION, SOLUTION INTRAVENOUS at 12:02

## 2024-04-19 RX ADMIN — PHENYLEPHRINE HYDROCHLORIDE 100 MCG: 10 INJECTION INTRAVENOUS at 12:54

## 2024-04-19 RX ADMIN — PROPOFOL 200 MCG/KG/MIN: 10 INJECTION, EMULSION INTRAVENOUS at 12:33

## 2024-04-19 RX ADMIN — PHENYLEPHRINE HYDROCHLORIDE 100 MCG: 10 INJECTION INTRAVENOUS at 12:57

## 2024-04-19 NOTE — H&P
Patient Care Team:  Nelson Silveira MD as PCP - General (Sports Medicine)  Duc Hurley MD as Consulting Physician (Urology)  Bong Cuadra MD as Consulting Physician (Gastroenterology)  Dennis Prater MD as Consulting Physician (Dermatology)    CHIEF COMPLAINT: Personal hx colon polyps and Barretts Esophagus    HISTORY OF PRESENT ILLNESS:  Last exams were 2020    Past Medical History:   Diagnosis Date    Casanova esophagus     Colon polyp     Elevated PSA     GERD (gastroesophageal reflux disease)      Past Surgical History:   Procedure Laterality Date    ANKLE SURGERY      Fracture. Pins placed     COLONOSCOPY      HAND SURGERY      both hands    KNEE ACL RECONSTRUCTION       Family History   Problem Relation Age of Onset    Heart failure Father     Diabetes Father     Obesity Father     Colon polyps Father     Colon cancer Neg Hx      Social History     Tobacco Use    Smoking status: Never     Passive exposure: Never    Smokeless tobacco: Never   Vaping Use    Vaping status: Never Used   Substance Use Topics    Alcohol use: No    Drug use: No     Medications Prior to Admission   Medication Sig Dispense Refill Last Dose    omeprazole (priLOSEC) 40 MG capsule Take 1 capsule by mouth 2 (Two) Times a Day. 180 capsule 0 4/19/2024 at 0700    OTEZLA 30 MG tablet    4/19/2024 at 0700    tamsulosin (FLOMAX) 0.4 MG capsule 24 hr capsule Take 1 capsule by mouth Every Night.   4/19/2024 at 0700    promethazine-dextromethorphan (PROMETHAZINE-DM) 6.25-15 MG/5ML syrup Take 5 mL by mouth 4 (Four) Times a Day As Needed for Cough. 120 mL 0 More than a month    sodium chloride 0.65 % nasal spray 1 spray into the nostril(s) as directed by provider As Needed for Congestion.   More than a month    triamcinolone (KENALOG) 0.1 % cream by Intratympanic route As Needed.   More than a month     Allergies:  Patient has no known allergies.    REVIEW OF SYSTEMS:  Please see the above history of present illness for  "pertinent positives and negatives.  The remainder of the patient's systems have been reviewed and are negative.     Vital Signs  Temp:  [97.4 °F (36.3 °C)] 97.4 °F (36.3 °C)  Heart Rate:  [104] 104  Resp:  [20] 20  BP: (123)/(83) 123/83    Flowsheet Rows      Flowsheet Row First Filed Value   Admission Height 170.2 cm (67\") Documented at 04/17/2024 1425   Admission Weight 90.7 kg (200 lb) Documented at 04/17/2024 1425             Physical Exam:  Physical Exam   Constitutional: Patient appears well-developed and well-nourished and in no acute distress   HEENT:   Head: Normocephalic and atraumatic.   Eyes:  Pupils are equal, round, and reactive to light. EOM are intact. Sclerae are anicteric and non-injected.  Mouth and Throat: Patient has moist mucous membranes. Oropharynx is clear of any erythema or exudate.     Neck: Neck supple. No JVD present. No thyromegaly present. No lymphadenopathy present.  Cardiovascular: Regular rate, regular rhythm, S1 normal and S2 normal.  Exam reveals no gallop and no friction rub.  No murmur heard.  Pulmonary/Chest: Lungs are clear to auscultation bilaterally. No respiratory distress. No wheezes. No rhonchi. No rales.   Abdominal: Soft. Bowel sounds are normal. No distension and no mass. There is no hepatosplenomegaly. There is no tenderness.   Musculoskeletal: Normal Muscle tone  Extremities: No edema. Pulses are palpable in all 4 extremities.  Neurological: Patient is alert and oriented to person, place, and time. Cranial nerves II-XII are grossly intact with no focal deficits.  Skin: Skin is warm. No rash noted. Nails show no clubbing.  No cyanosis or erythema.    Debilities/Disabilities Identified: None  Emotional Behavior: Appropriate     Results Review:   I reviewed the patient's new clinical results.    Lab Results (most recent)       None            Imaging Results (Most Recent)       None          reviewed    ECG/EMG Results (most recent)       None      "     reviewed    Assessment & Plan   Personal hx colon polyps and Barretts Esophagus/  EGD and colonoscopy      I discussed the patient's findings and my recommendations with patient.     Bong Cuadra MD  04/19/24  12:27 EDT    Time: 10 min prior to procedure.

## 2024-04-19 NOTE — ANESTHESIA POSTPROCEDURE EVALUATION
"Patient: Trent Olvera    Procedure Summary       Date: 04/19/24 Room / Location: [facility]    Anesthesia Start: 1228 Anesthesia Stop: 1259    Procedures:       ESOPHAGOGASTRODUODENOSCOPY (Esophagus)      COLONOSCOPY to Cecum with Polypectomy Diagnosis:       Casanova's esophagus without dysplasia      Personal history of colonic polyps      Diverticulosis      Colon polyp      (Casanova's esophagus without dysplasia [K22.70])      (Personal history of colonic polyps [Z86.010])    Surgeons: Bong Cuadra MD Provider: Huy Sanders MD    Anesthesia Type: MAC ASA Status: 2            Anesthesia Type: MAC    Vitals  Vitals Value Taken Time   /62 04/19/24 1328   Temp 36.5 °C (97.7 °F) 04/19/24 1304   Pulse 84 04/19/24 1324   Resp 18 04/19/24 1310   SpO2 92 % 04/19/24 1324   Vitals shown include unfiled device data.        Post Anesthesia Care and Evaluation    Level of consciousness: awake and alert  Pain management: adequate    Airway patency: patent  Anesthetic complications: No anesthetic complications  PONV Status: none  Cardiovascular status: acceptable  Respiratory status: acceptable  Hydration status: acceptable    Comments: BP (!) 77/65   Pulse 90   Temp 36.5 °C (97.7 °F) (Tympanic)   Resp 18   Ht 170.2 cm (67\")   Wt 92.4 kg (203 lb 9.6 oz)   SpO2 98%   BMI 31.89 kg/m²       "

## 2024-04-19 NOTE — ANESTHESIA PREPROCEDURE EVALUATION
Anesthesia Evaluation     Patient summary reviewed and Nursing notes reviewed   no history of anesthetic complications:   NPO Solid Status: > 8 hours  NPO Liquid Status: > 8 hours           Airway   Mallampati: II  TM distance: >3 FB  Neck ROM: full  No difficulty expected  Dental - normal exam     Pulmonary    (+) ,sleep apnea on CPAP  (-) asthma, rhonchi, decreased breath sounds, wheezes, not a smoker  Cardiovascular   Exercise tolerance: good (4-7 METS)    Rhythm: regular  Rate: normal    (-) hypertension, CAD, dysrhythmias, angina, ALVARADO, murmur      Neuro/Psych  (-) seizures, CVA  GI/Hepatic/Renal/Endo    (+) GERD  (-) liver disease, no renal disease, diabetes, no thyroid disorder    Musculoskeletal     Abdominal     Abdomen: soft.   Substance History      OB/GYN          Other   arthritis,                 Anesthesia Plan    ASA 2     MAC   total IV anesthesia  intravenous induction     Anesthetic plan, risks, benefits, and alternatives have been provided, discussed and informed consent has been obtained with: patient.    CODE STATUS:

## 2024-04-19 NOTE — BRIEF OP NOTE
ESOPHAGOGASTRODUODENOSCOPY, COLONOSCOPY  Progress Note    Trent Olvera  4/19/2024    Pre-op Diagnosis:   Casanova's esophagus without dysplasia [K22.70]  Personal history of colonic polyps [Z86.010]       Post-Op Diagnosis Codes:     * Casanova's esophagus without dysplasia [K22.70]     * Personal history of colonic polyps [Z86.010]     * Diverticulosis [K57.90]     * Colon polyp [K63.5]    Procedure/CPT® Codes:        Procedure(s):  ESOPHAGOGASTRODUODENOSCOPY  COLONOSCOPY to Cecum with Polypectomy              Surgeon(s):  Bong Cuadra MD    Anesthesia: Monitored Anesthesia Care    Staff:   Endo Technician: Ruby German, RN  Endo Nurse: Jessica Rand RN; Coleen Pisano RN         Estimated Blood Loss: none    Urine Voided: * No values recorded between 4/19/2024 12:28 PM and 4/19/2024 12:58 PM *    Specimens:                Specimens       ID Source Type Tests Collected By Collected At Frozen?    A Esophagus, Distal Tissue TISSUE PATHOLOGY EXAM   Bong Cuadra MD 4/19/24 1239 No    Description: distal esophagus biopsy    Comment: distal esophagus biopsy    This specimen was not marked as sent.    B Large Intestine, Sigmoid Colon Tissue TISSUE PATHOLOGY EXAM   Bong Cuadra MD 4/19/24 1241 No    Description: Sigmoid  Polyp x3    This specimen was not marked as sent.                  Drains: * No LDAs found *    Findings: Normal Duodenum  Normal stomach  SSBE-Biopsy    Colon to TI good Prep  Pan-Colonic Diverticulosis  Polyps-3-Biopsy         Complications: None          Bong Cuadra MD     Date: 4/19/2024  Time: 13:00 EDT

## 2024-04-22 LAB
LAB AP CASE REPORT: NORMAL
LAB AP CLINICAL INFORMATION: NORMAL
PATH REPORT.FINAL DX SPEC: NORMAL
PATH REPORT.GROSS SPEC: NORMAL

## 2024-06-04 ENCOUNTER — TELEPHONE (OUTPATIENT)
Dept: GASTROENTEROLOGY | Facility: CLINIC | Age: 64
End: 2024-06-04
Payer: COMMERCIAL

## 2024-06-04 DIAGNOSIS — K22.70 BARRETT'S ESOPHAGUS WITHOUT DYSPLASIA: ICD-10-CM

## 2024-06-04 RX ORDER — OMEPRAZOLE 40 MG/1
40 CAPSULE, DELAYED RELEASE ORAL 2 TIMES DAILY
Qty: 180 CAPSULE | Refills: 0 | Status: SHIPPED | OUTPATIENT
Start: 2024-06-04

## 2024-06-04 NOTE — TELEPHONE ENCOUNTER
Received refill request from [facility] on Omeprazole. Patient has not followed up from EGD. Patient scheduled an appointment with Dr. Cuadra on 9/17/2024. Will send enough refills until the appointment.

## 2024-09-17 ENCOUNTER — OFFICE VISIT (OUTPATIENT)
Dept: GASTROENTEROLOGY | Facility: CLINIC | Age: 64
End: 2024-09-17
Payer: COMMERCIAL

## 2024-09-17 VITALS
HEIGHT: 67 IN | SYSTOLIC BLOOD PRESSURE: 102 MMHG | WEIGHT: 201.1 LBS | BODY MASS INDEX: 31.56 KG/M2 | DIASTOLIC BLOOD PRESSURE: 70 MMHG

## 2024-09-17 DIAGNOSIS — Z86.010 PERSONAL HISTORY OF COLONIC POLYPS: ICD-10-CM

## 2024-09-17 DIAGNOSIS — K22.70 BARRETT'S ESOPHAGUS WITHOUT DYSPLASIA: Primary | ICD-10-CM

## 2024-09-17 PROCEDURE — 99212 OFFICE O/P EST SF 10 MIN: CPT | Performed by: INTERNAL MEDICINE

## 2024-09-17 RX ORDER — OMEPRAZOLE 40 MG/1
40 CAPSULE, DELAYED RELEASE ORAL 2 TIMES DAILY
Qty: 180 CAPSULE | Refills: 3 | Status: SHIPPED | OUTPATIENT
Start: 2024-09-17

## 2024-09-17 RX ORDER — FLUOCINOLONE ACETONIDE 0.11 MG/ML
OIL AURICULAR (OTIC)
COMMUNITY
Start: 2024-07-29

## 2025-07-17 DIAGNOSIS — K22.70 BARRETT'S ESOPHAGUS WITHOUT DYSPLASIA: ICD-10-CM

## 2025-07-17 RX ORDER — OMEPRAZOLE 40 MG/1
40 CAPSULE, DELAYED RELEASE ORAL 2 TIMES DAILY
Qty: 180 CAPSULE | Refills: 0 | Status: SHIPPED | OUTPATIENT
Start: 2025-07-17

## 2025-08-04 ENCOUNTER — TELEPHONE (OUTPATIENT)
Dept: GASTROENTEROLOGY | Facility: CLINIC | Age: 65
End: 2025-08-04
Payer: COMMERCIAL

## (undated) DEVICE — Device

## (undated) DEVICE — GOWN ISOL W/THUMB UNIV BLU BX/15

## (undated) DEVICE — SINGLE-USE BIOPSY FORCEPS: Brand: RADIAL JAW 4

## (undated) DEVICE — CANN O2 ETCO2 FITS ALL CONN CO2 SMPL A/ 7IN DISP LF

## (undated) DEVICE — BITEBLOCK OMNI BLOC

## (undated) DEVICE — VIAL FORMLN CAP 10PCT 20ML

## (undated) DEVICE — KT ORCA ORCAPOD DISP STRL

## (undated) DEVICE — ADAPT CLN SCPE ENDO PORPOISE BX/50 DISP

## (undated) DEVICE — JACKT LAB F/R KNIT CUFF/COLR XLG BLU

## (undated) DEVICE — FLEX ADVANTAGE 1500CC: Brand: FLEX ADVANTAGE